# Patient Record
Sex: MALE | Race: BLACK OR AFRICAN AMERICAN | NOT HISPANIC OR LATINO
[De-identification: names, ages, dates, MRNs, and addresses within clinical notes are randomized per-mention and may not be internally consistent; named-entity substitution may affect disease eponyms.]

---

## 2020-10-01 ENCOUNTER — NON-APPOINTMENT (OUTPATIENT)
Age: 60
End: 2020-10-01

## 2020-10-01 ENCOUNTER — APPOINTMENT (OUTPATIENT)
Dept: CARDIOLOGY | Facility: CLINIC | Age: 60
End: 2020-10-01
Payer: COMMERCIAL

## 2020-10-01 VITALS
OXYGEN SATURATION: 98 % | TEMPERATURE: 98.2 F | HEIGHT: 74 IN | WEIGHT: 234 LBS | DIASTOLIC BLOOD PRESSURE: 100 MMHG | SYSTOLIC BLOOD PRESSURE: 140 MMHG | BODY MASS INDEX: 30.03 KG/M2 | HEART RATE: 62 BPM

## 2020-10-01 PROCEDURE — 93306 TTE W/DOPPLER COMPLETE: CPT

## 2020-10-01 PROCEDURE — 99214 OFFICE O/P EST MOD 30 MIN: CPT

## 2020-10-01 PROCEDURE — 93000 ELECTROCARDIOGRAM COMPLETE: CPT
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
## 2020-10-01 NOTE — PHYSICAL EXAM
[General Appearance - Well Developed] : well developed [Normal Appearance] : normal appearance [Well Groomed] : well groomed [General Appearance - Well Nourished] : well nourished [No Deformities] : no deformities [General Appearance - In No Acute Distress] : no acute distress [Normal Conjunctiva] : the conjunctiva exhibited no abnormalities [Eyelids - No Xanthelasma] : the eyelids demonstrated no xanthelasmas [Normal Oral Mucosa] : normal oral mucosa [No Oral Pallor] : no oral pallor [No Oral Cyanosis] : no oral cyanosis [Normal Jugular Venous A Waves Present] : normal jugular venous A waves present [Normal Jugular Venous V Waves Present] : normal jugular venous V waves present [No Jugular Venous Vela A Waves] : no jugular venous vela A waves [Respiration, Rhythm And Depth] : normal respiratory rhythm and effort [Exaggerated Use Of Accessory Muscles For Inspiration] : no accessory muscle use [Auscultation Breath Sounds / Voice Sounds] : lungs were clear to auscultation bilaterally [Heart Rate And Rhythm] : heart rate and rhythm were normal [Heart Sounds] : normal S1 and S2 [Murmurs] : no murmurs present [Abdomen Soft] : soft [Abdomen Tenderness] : non-tender [Abdomen Mass (___ Cm)] : no abdominal mass palpated [Abnormal Walk] : normal gait [Gait - Sufficient For Exercise Testing] : the gait was sufficient for exercise testing [Nail Clubbing] : no clubbing of the fingernails [Cyanosis, Localized] : no localized cyanosis [Petechial Hemorrhages (___cm)] : no petechial hemorrhages [Skin Color & Pigmentation] : normal skin color and pigmentation [] : no rash [No Venous Stasis] : no venous stasis [Skin Lesions] : no skin lesions [No Skin Ulcers] : no skin ulcer [No Xanthoma] : no  xanthoma was observed [Oriented To Time, Place, And Person] : oriented to person, place, and time [Affect] : the affect was normal [Mood] : the mood was normal [No Anxiety] : not feeling anxious

## 2020-10-01 NOTE — DISCUSSION/SUMMARY
[Procedure Low Risk] : the procedure risk is low [Patient Low Risk] : the patient is a low surgical risk [Optimized for Surgery] : the patient is optimized for surgery [As per surgery] : as per surgery [Continue] : Continue medications as currently directed [FreeTextEntry1] : 58 yo male for orthopedic surgery. No prior h/o cardiac disease, abnormal ECG unchanged since 2015. Hypertension adequately controlled. Normal LVEF on echo. No cardiac contraindications to surgery.

## 2021-11-02 ENCOUNTER — NON-APPOINTMENT (OUTPATIENT)
Age: 61
End: 2021-11-02

## 2021-11-02 ENCOUNTER — APPOINTMENT (OUTPATIENT)
Dept: CARDIOLOGY | Facility: CLINIC | Age: 61
End: 2021-11-02
Payer: COMMERCIAL

## 2021-11-02 VITALS
HEIGHT: 74 IN | SYSTOLIC BLOOD PRESSURE: 160 MMHG | WEIGHT: 240 LBS | HEART RATE: 71 BPM | BODY MASS INDEX: 30.8 KG/M2 | OXYGEN SATURATION: 98 % | DIASTOLIC BLOOD PRESSURE: 96 MMHG | TEMPERATURE: 98.2 F

## 2021-11-02 VITALS — DIASTOLIC BLOOD PRESSURE: 94 MMHG | SYSTOLIC BLOOD PRESSURE: 156 MMHG

## 2021-11-02 DIAGNOSIS — Z01.810 ENCOUNTER FOR PREPROCEDURAL CARDIOVASCULAR EXAMINATION: ICD-10-CM

## 2021-11-02 DIAGNOSIS — E66.9 OBESITY, UNSPECIFIED: ICD-10-CM

## 2021-11-02 DIAGNOSIS — I51.7 CARDIOMEGALY: ICD-10-CM

## 2021-11-02 PROCEDURE — 93000 ELECTROCARDIOGRAM COMPLETE: CPT

## 2021-11-02 PROCEDURE — 99214 OFFICE O/P EST MOD 30 MIN: CPT

## 2021-11-02 RX ORDER — TIZANIDINE 2 MG/1
2 TABLET ORAL
Qty: 60 | Refills: 0 | Status: ACTIVE | COMMUNITY
Start: 2021-10-22

## 2021-11-02 NOTE — CARDIOLOGY SUMMARY
[No Ischemia] : no Ischemia [No Exercise Ind Arr] : no exercise induced arrhythmias [No Symptoms] : no Symptoms [___] : [unfilled] [LVEF ___%] : LVEF [unfilled]% [___] : [unfilled] [de-identified] : 11/2/21, Sinus  Rhythm \par -Nonspecific QRS widening and anterior fascicular block. \par  -consider old anterior infarct.

## 2021-11-02 NOTE — DISCUSSION/SUMMARY
[___ Month(s)] : in [unfilled] month(s) [FreeTextEntry1] : 60yo male with no prior h/o cardiac disease, abnormal ECG unchanged since 2015 (prob LVH with widened QRS). Hypertension inadequately controlled, only taking labetalol once daily, would be better served from twice daily usage and continue to monitor his blood pressures.  Follow-up with PCP, self measured systolic blood pressure goal should be in the 120s.  We will follow-up in 6 to 8 months.  Increased weight noted, fairly sedentary because of neck injury, awaiting clearance from orthopedist to begin aerobic exercise.  Will get copies of labs from PCP.
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
## 2022-06-10 ENCOUNTER — APPOINTMENT (OUTPATIENT)
Dept: CARDIOLOGY | Facility: CLINIC | Age: 62
End: 2022-06-10
Payer: COMMERCIAL

## 2022-06-10 ENCOUNTER — NON-APPOINTMENT (OUTPATIENT)
Age: 62
End: 2022-06-10

## 2022-06-10 VITALS
OXYGEN SATURATION: 98 % | DIASTOLIC BLOOD PRESSURE: 94 MMHG | HEIGHT: 74 IN | TEMPERATURE: 97.9 F | BODY MASS INDEX: 29.39 KG/M2 | HEART RATE: 70 BPM | WEIGHT: 229 LBS | SYSTOLIC BLOOD PRESSURE: 150 MMHG

## 2022-06-10 DIAGNOSIS — R94.31 ABNORMAL ELECTROCARDIOGRAM [ECG] [EKG]: ICD-10-CM

## 2022-06-10 DIAGNOSIS — I10 ESSENTIAL (PRIMARY) HYPERTENSION: ICD-10-CM

## 2022-06-10 PROCEDURE — 99213 OFFICE O/P EST LOW 20 MIN: CPT

## 2022-06-10 PROCEDURE — 93000 ELECTROCARDIOGRAM COMPLETE: CPT

## 2022-06-10 NOTE — CARDIOLOGY SUMMARY
[de-identified] : 6/10/22, Sinus Rhythm \par -Nonspecific QRS widening and anterior fascicular block. \par -consider old anterior infarct.\par 11/2/21, Sinus  Rhythm, -Nonspecific QRS widening and anterior fascicular block.  -consider old anterior infarct.  [No Ischemia] : no Ischemia [No Exercise Ind Arr] : no exercise induced arrhythmias [No Symptoms] : no Symptoms [___] : [unfilled] [LVEF ___%] : LVEF [unfilled]% [___] : [unfilled]
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
## 2022-06-10 NOTE — DISCUSSION/SUMMARY
[___ Month(s)] : in [unfilled] month(s) [FreeTextEntry1] : 62 yo male with no prior h/o cardiac disease, abnormal ECG unchanged since 2015 (probably LVH with widened QRS). Hypertension with white coat effect - will call patient in 2-3 weeks and assess his self-measured BP's.  Continue current Rx for now. Labs reviewed. Increase activity as tolerated. If BP's adequate, routine f/u in 6 months.

## 2022-12-16 ENCOUNTER — APPOINTMENT (OUTPATIENT)
Dept: CARDIOLOGY | Facility: CLINIC | Age: 62
End: 2022-12-16

## 2025-01-03 ENCOUNTER — EMERGENCY (EMERGENCY)
Facility: HOSPITAL | Age: 65
LOS: 0 days | Discharge: ANOTHER HOSPITAL | End: 2025-01-03
Attending: STUDENT IN AN ORGANIZED HEALTH CARE EDUCATION/TRAINING PROGRAM
Payer: COMMERCIAL

## 2025-01-03 ENCOUNTER — INPATIENT (INPATIENT)
Facility: HOSPITAL | Age: 65
LOS: 5 days | Discharge: HOME | DRG: 948 | End: 2025-01-09
Attending: STUDENT IN AN ORGANIZED HEALTH CARE EDUCATION/TRAINING PROGRAM | Admitting: STUDENT IN AN ORGANIZED HEALTH CARE EDUCATION/TRAINING PROGRAM
Payer: COMMERCIAL

## 2025-01-03 VITALS
OXYGEN SATURATION: 96 % | SYSTOLIC BLOOD PRESSURE: 179 MMHG | RESPIRATION RATE: 16 BRPM | DIASTOLIC BLOOD PRESSURE: 109 MMHG | WEIGHT: 220.02 LBS | TEMPERATURE: 101 F | HEART RATE: 83 BPM

## 2025-01-03 VITALS
DIASTOLIC BLOOD PRESSURE: 99 MMHG | RESPIRATION RATE: 18 BRPM | TEMPERATURE: 101 F | HEART RATE: 82 BPM | OXYGEN SATURATION: 97 % | SYSTOLIC BLOOD PRESSURE: 157 MMHG

## 2025-01-03 VITALS
SYSTOLIC BLOOD PRESSURE: 196 MMHG | DIASTOLIC BLOOD PRESSURE: 115 MMHG | HEART RATE: 98 BPM | WEIGHT: 240.08 LBS | OXYGEN SATURATION: 97 % | HEIGHT: 75 IN | TEMPERATURE: 100 F | RESPIRATION RATE: 20 BRPM

## 2025-01-03 DIAGNOSIS — R41.82 ALTERED MENTAL STATUS, UNSPECIFIED: ICD-10-CM

## 2025-01-03 DIAGNOSIS — I62.00 NONTRAUMATIC SUBDURAL HEMORRHAGE, UNSPECIFIED: ICD-10-CM

## 2025-01-03 DIAGNOSIS — I10 ESSENTIAL (PRIMARY) HYPERTENSION: ICD-10-CM

## 2025-01-03 DIAGNOSIS — Z88.1 ALLERGY STATUS TO OTHER ANTIBIOTIC AGENTS: ICD-10-CM

## 2025-01-03 LAB
ALBUMIN SERPL ELPH-MCNC: 3.6 G/DL — SIGNIFICANT CHANGE UP (ref 3.3–5)
ALBUMIN SERPL ELPH-MCNC: 3.9 G/DL — SIGNIFICANT CHANGE UP (ref 3.3–5)
ALP SERPL-CCNC: 87 U/L — SIGNIFICANT CHANGE UP (ref 40–120)
ALP SERPL-CCNC: 98 U/L — SIGNIFICANT CHANGE UP (ref 40–120)
ALT FLD-CCNC: 24 U/L — SIGNIFICANT CHANGE UP (ref 10–45)
ALT FLD-CCNC: 35 U/L — SIGNIFICANT CHANGE UP (ref 12–78)
ANION GAP SERPL CALC-SCNC: 12 MMOL/L — SIGNIFICANT CHANGE UP (ref 5–17)
ANION GAP SERPL CALC-SCNC: 20 MMOL/L — HIGH (ref 5–17)
APPEARANCE UR: CLEAR — SIGNIFICANT CHANGE UP
APTT BLD: 24.8 SEC — SIGNIFICANT CHANGE UP (ref 24.5–35.6)
APTT BLD: 25.3 SEC — SIGNIFICANT CHANGE UP (ref 24.5–35.6)
AST SERPL-CCNC: 29 U/L — SIGNIFICANT CHANGE UP (ref 10–40)
AST SERPL-CCNC: 29 U/L — SIGNIFICANT CHANGE UP (ref 15–37)
BASOPHILS # BLD AUTO: 0.03 K/UL — SIGNIFICANT CHANGE UP (ref 0–0.2)
BASOPHILS # BLD AUTO: 0.03 K/UL — SIGNIFICANT CHANGE UP (ref 0–0.2)
BASOPHILS NFR BLD AUTO: 0.3 % — SIGNIFICANT CHANGE UP (ref 0–2)
BASOPHILS NFR BLD AUTO: 0.3 % — SIGNIFICANT CHANGE UP (ref 0–2)
BILIRUB SERPL-MCNC: 0.6 MG/DL — SIGNIFICANT CHANGE UP (ref 0.2–1.2)
BILIRUB SERPL-MCNC: 0.6 MG/DL — SIGNIFICANT CHANGE UP (ref 0.2–1.2)
BILIRUB UR-MCNC: NEGATIVE — SIGNIFICANT CHANGE UP
BUN SERPL-MCNC: 14 MG/DL — SIGNIFICANT CHANGE UP (ref 7–23)
BUN SERPL-MCNC: 18 MG/DL — SIGNIFICANT CHANGE UP (ref 7–23)
CALCIUM SERPL-MCNC: 8.4 MG/DL — SIGNIFICANT CHANGE UP (ref 8.4–10.5)
CALCIUM SERPL-MCNC: 9.7 MG/DL — SIGNIFICANT CHANGE UP (ref 8.5–10.1)
CHLORIDE SERPL-SCNC: 101 MMOL/L — SIGNIFICANT CHANGE UP (ref 96–108)
CHLORIDE SERPL-SCNC: 97 MMOL/L — SIGNIFICANT CHANGE UP (ref 96–108)
CO2 SERPL-SCNC: 18 MMOL/L — LOW (ref 22–31)
CO2 SERPL-SCNC: 25 MMOL/L — SIGNIFICANT CHANGE UP (ref 22–31)
COLOR SPEC: YELLOW — SIGNIFICANT CHANGE UP
CREAT SERPL-MCNC: 1.09 MG/DL — SIGNIFICANT CHANGE UP (ref 0.5–1.3)
CREAT SERPL-MCNC: 1.61 MG/DL — HIGH (ref 0.5–1.3)
DIFF PNL FLD: NEGATIVE — SIGNIFICANT CHANGE UP
EGFR: 48 ML/MIN/1.73M2 — LOW
EGFR: 77 ML/MIN/1.73M2 — SIGNIFICANT CHANGE UP
EOSINOPHIL # BLD AUTO: 0 K/UL — SIGNIFICANT CHANGE UP (ref 0–0.5)
EOSINOPHIL # BLD AUTO: 0 K/UL — SIGNIFICANT CHANGE UP (ref 0–0.5)
EOSINOPHIL NFR BLD AUTO: 0 % — SIGNIFICANT CHANGE UP (ref 0–6)
EOSINOPHIL NFR BLD AUTO: 0 % — SIGNIFICANT CHANGE UP (ref 0–6)
FLUAV AG NPH QL: SIGNIFICANT CHANGE UP
FLUBV AG NPH QL: SIGNIFICANT CHANGE UP
GAS PNL BLDV: SIGNIFICANT CHANGE UP
GLUCOSE SERPL-MCNC: 323 MG/DL — HIGH (ref 70–99)
GLUCOSE SERPL-MCNC: 406 MG/DL — HIGH (ref 70–99)
GLUCOSE UR QL: >=1000 MG/DL
HCT VFR BLD CALC: 40.5 % — SIGNIFICANT CHANGE UP (ref 39–50)
HCT VFR BLD CALC: 43.1 % — SIGNIFICANT CHANGE UP (ref 39–50)
HGB BLD-MCNC: 13.9 G/DL — SIGNIFICANT CHANGE UP (ref 13–17)
HGB BLD-MCNC: 14.9 G/DL — SIGNIFICANT CHANGE UP (ref 13–17)
IMM GRANULOCYTES NFR BLD AUTO: 0.2 % — SIGNIFICANT CHANGE UP (ref 0–0.9)
IMM GRANULOCYTES NFR BLD AUTO: 0.4 % — SIGNIFICANT CHANGE UP (ref 0–0.9)
INR BLD: 0.99 RATIO — SIGNIFICANT CHANGE UP (ref 0.85–1.16)
INR BLD: 1.1 RATIO — SIGNIFICANT CHANGE UP (ref 0.85–1.16)
KETONES UR-MCNC: 40 MG/DL
LEUKOCYTE ESTERASE UR-ACNC: NEGATIVE — SIGNIFICANT CHANGE UP
LYMPHOCYTES # BLD AUTO: 1.41 K/UL — SIGNIFICANT CHANGE UP (ref 1–3.3)
LYMPHOCYTES # BLD AUTO: 15.1 % — SIGNIFICANT CHANGE UP (ref 13–44)
LYMPHOCYTES # BLD AUTO: 2.42 K/UL — SIGNIFICANT CHANGE UP (ref 1–3.3)
LYMPHOCYTES # BLD AUTO: 21.9 % — SIGNIFICANT CHANGE UP (ref 13–44)
MCHC RBC-ENTMCNC: 28.3 PG — SIGNIFICANT CHANGE UP (ref 27–34)
MCHC RBC-ENTMCNC: 28.5 PG — SIGNIFICANT CHANGE UP (ref 27–34)
MCHC RBC-ENTMCNC: 34.3 G/DL — SIGNIFICANT CHANGE UP (ref 32–36)
MCHC RBC-ENTMCNC: 34.6 G/DL — SIGNIFICANT CHANGE UP (ref 32–36)
MCV RBC AUTO: 81.8 FL — SIGNIFICANT CHANGE UP (ref 80–100)
MCV RBC AUTO: 83 FL — SIGNIFICANT CHANGE UP (ref 80–100)
MONOCYTES # BLD AUTO: 0.33 K/UL — SIGNIFICANT CHANGE UP (ref 0–0.9)
MONOCYTES # BLD AUTO: 0.7 K/UL — SIGNIFICANT CHANGE UP (ref 0–0.9)
MONOCYTES NFR BLD AUTO: 3.5 % — SIGNIFICANT CHANGE UP (ref 2–14)
MONOCYTES NFR BLD AUTO: 6.3 % — SIGNIFICANT CHANGE UP (ref 2–14)
NEUTROPHILS # BLD AUTO: 7.52 K/UL — HIGH (ref 1.8–7.4)
NEUTROPHILS # BLD AUTO: 7.85 K/UL — HIGH (ref 1.8–7.4)
NEUTROPHILS NFR BLD AUTO: 71.1 % — SIGNIFICANT CHANGE UP (ref 43–77)
NEUTROPHILS NFR BLD AUTO: 80.9 % — HIGH (ref 43–77)
NITRITE UR-MCNC: NEGATIVE — SIGNIFICANT CHANGE UP
NRBC # BLD: 0 /100 WBCS — SIGNIFICANT CHANGE UP (ref 0–0)
NRBC # BLD: 0 /100 WBCS — SIGNIFICANT CHANGE UP (ref 0–0)
PH UR: 6.5 — SIGNIFICANT CHANGE UP (ref 5–8)
PLATELET # BLD AUTO: 270 K/UL — SIGNIFICANT CHANGE UP (ref 150–400)
PLATELET # BLD AUTO: 279 K/UL — SIGNIFICANT CHANGE UP (ref 150–400)
POTASSIUM SERPL-MCNC: 3.6 MMOL/L — SIGNIFICANT CHANGE UP (ref 3.5–5.3)
POTASSIUM SERPL-MCNC: 3.9 MMOL/L — SIGNIFICANT CHANGE UP (ref 3.5–5.3)
POTASSIUM SERPL-SCNC: 3.6 MMOL/L — SIGNIFICANT CHANGE UP (ref 3.5–5.3)
POTASSIUM SERPL-SCNC: 3.9 MMOL/L — SIGNIFICANT CHANGE UP (ref 3.5–5.3)
PROT SERPL-MCNC: 7.5 G/DL — SIGNIFICANT CHANGE UP (ref 6–8.3)
PROT SERPL-MCNC: 8.4 GM/DL — HIGH (ref 6–8.3)
PROT UR-MCNC: 30 MG/DL
PROTHROM AB SERPL-ACNC: 11.5 SEC — SIGNIFICANT CHANGE UP (ref 9.9–13.4)
PROTHROM AB SERPL-ACNC: 12.6 SEC — SIGNIFICANT CHANGE UP (ref 9.9–13.4)
RBC # BLD: 4.88 M/UL — SIGNIFICANT CHANGE UP (ref 4.2–5.8)
RBC # BLD: 5.27 M/UL — SIGNIFICANT CHANGE UP (ref 4.2–5.8)
RBC # FLD: 13.9 % — SIGNIFICANT CHANGE UP (ref 10.3–14.5)
RBC # FLD: 14.1 % — SIGNIFICANT CHANGE UP (ref 10.3–14.5)
RSV RNA NPH QL NAA+NON-PROBE: SIGNIFICANT CHANGE UP
SARS-COV-2 RNA SPEC QL NAA+PROBE: SIGNIFICANT CHANGE UP
SODIUM SERPL-SCNC: 134 MMOL/L — LOW (ref 135–145)
SODIUM SERPL-SCNC: 139 MMOL/L — SIGNIFICANT CHANGE UP (ref 135–145)
SP GR SPEC: 1.02 — SIGNIFICANT CHANGE UP (ref 1–1.03)
UROBILINOGEN FLD QL: 0.2 MG/DL — SIGNIFICANT CHANGE UP (ref 0.2–1)
WBC # BLD: 11.04 K/UL — HIGH (ref 3.8–10.5)
WBC # BLD: 9.31 K/UL — SIGNIFICANT CHANGE UP (ref 3.8–10.5)
WBC # FLD AUTO: 11.04 K/UL — HIGH (ref 3.8–10.5)
WBC # FLD AUTO: 9.31 K/UL — SIGNIFICANT CHANGE UP (ref 3.8–10.5)

## 2025-01-03 PROCEDURE — 99285 EMERGENCY DEPT VISIT HI MDM: CPT | Mod: 25

## 2025-01-03 PROCEDURE — 99285 EMERGENCY DEPT VISIT HI MDM: CPT

## 2025-01-03 PROCEDURE — 70450 CT HEAD/BRAIN W/O DYE: CPT | Mod: 26,MC

## 2025-01-03 PROCEDURE — 71045 X-RAY EXAM CHEST 1 VIEW: CPT | Mod: 26

## 2025-01-03 PROCEDURE — 62270 DX LMBR SPI PNXR: CPT

## 2025-01-03 PROCEDURE — 93010 ELECTROCARDIOGRAM REPORT: CPT

## 2025-01-03 RX ORDER — 0.9 % SODIUM CHLORIDE 0.9 %
3400 INTRAVENOUS SOLUTION INTRAVENOUS ONCE
Refills: 0 | Status: DISCONTINUED | OUTPATIENT
Start: 2025-01-03 | End: 2025-01-03

## 2025-01-03 RX ORDER — ACETAMINOPHEN 500MG 500 MG/1
1000 TABLET, COATED ORAL ONCE
Refills: 0 | Status: COMPLETED | OUTPATIENT
Start: 2025-01-03 | End: 2025-01-03

## 2025-01-03 RX ORDER — VANCOMYCIN HCL 900 MCG/MG
1000 POWDER (GRAM) MISCELLANEOUS ONCE
Refills: 0 | Status: COMPLETED | OUTPATIENT
Start: 2025-01-03 | End: 2025-01-03

## 2025-01-03 RX ORDER — SODIUM CHLORIDE 9 MG/ML
3400 INJECTION, SOLUTION INTRAMUSCULAR; INTRAVENOUS; SUBCUTANEOUS ONCE
Refills: 0 | Status: COMPLETED | OUTPATIENT
Start: 2025-01-03 | End: 2025-01-03

## 2025-01-03 RX ORDER — PIPERACILLIN SODIUM AND TAZOBACTAM SODIUM 4; .5 G/20ML; G/20ML
3.38 INJECTION, POWDER, LYOPHILIZED, FOR SOLUTION INTRAVENOUS ONCE
Refills: 0 | Status: COMPLETED | OUTPATIENT
Start: 2025-01-03 | End: 2025-01-03

## 2025-01-03 RX ORDER — LIDOCAINE HYDROCHLORIDE 10 MG/ML
10 INJECTION INFILTRATION; PERINEURAL ONCE
Refills: 0 | Status: COMPLETED | OUTPATIENT
Start: 2025-01-03 | End: 2025-01-03

## 2025-01-03 RX ADMIN — Medication 250 MILLIGRAM(S): at 20:04

## 2025-01-03 RX ADMIN — ACETAMINOPHEN 500MG 1000 MILLIGRAM(S): 500 TABLET, COATED ORAL at 19:30

## 2025-01-03 RX ADMIN — PIPERACILLIN SODIUM AND TAZOBACTAM SODIUM 200 GRAM(S): 4; .5 INJECTION, POWDER, LYOPHILIZED, FOR SOLUTION INTRAVENOUS at 18:46

## 2025-01-03 RX ADMIN — LIDOCAINE HYDROCHLORIDE 10 MILLILITER(S): 10 INJECTION INFILTRATION; PERINEURAL at 23:10

## 2025-01-03 RX ADMIN — ACETAMINOPHEN 500MG 400 MILLIGRAM(S): 500 TABLET, COATED ORAL at 18:39

## 2025-01-03 RX ADMIN — SODIUM CHLORIDE 3400 MILLILITER(S): 9 INJECTION, SOLUTION INTRAMUSCULAR; INTRAVENOUS; SUBCUTANEOUS at 18:39

## 2025-01-03 NOTE — ED ADULT TRIAGE NOTE - CHIEF COMPLAINT QUOTE
Increase thirst ,Increase urination  By EMS H/O  HTN Increase thirst ,Increase urination  By EMS H/O  HTN  IV Left AC by EMS

## 2025-01-03 NOTE — ED ADULT NURSE NOTE - OBJECTIVE STATEMENT
64 year old male A/Ox1 accompanied by wife and family c/o AMS, wife reports pt was normal at 0600 this AM, upon coming home sister found patient to be altered, pt unable to form sentences or words at this moment, pt placed on portable monitor, even and unlabored respirations noted, patient at baseline is A/Ox4 and ambulatory with a steady gait

## 2025-01-03 NOTE — ED ADULT NURSE NOTE - NSFALLRISKINTERV_ED_ALL_ED

## 2025-01-03 NOTE — ED PROVIDER NOTE - OBJECTIVE STATEMENT
64-year-old male history of hypertension presenting with concerns of altered mental status.  Patient accompanied by daughter and wife for collateral.  States they found the patient around 6 PM with increased lethargy.  States they saw him this morning around 6 AM and patient was endorsing a mild headache however was conversive.  Patient has had increased urination with increased thirst over the past week.  Patient was brought in by EMS with a fingerstick of 398.  Patient following commands at the bedside.  Denies any URI symptoms, abdominal pain, nausea vomiting diarrhea. 64-year-old male history of hypertension presenting with concerns of altered mental status.  Patient accompanied by daughter and wife for collateral.  States they found the patient around 6 PM with increased lethargy.  States they saw him this morning around 6 AM and patient was endorsing a mild headache however was conversive.  Patient has had increased urination with increased thirst over the past week.  Patient was brought in by EMS with a fingerstick of 398.  Patient following commands at the bedside. Family denies any recent trauma or falls.  Denies any URI symptoms, abdominal pain, nausea vomiting diarrhea, urinary complaints. No recent travel/sick contacts.

## 2025-01-03 NOTE — ED ADULT NURSE NOTE - OBJECTIVE STATEMENT
Pt is a 65y/o M BIBEMS from [facility] to [facility] ED for neuro consult. As per EMS, pt has been experiencing 4-6 days of headaches with an associated fever, was seen at [facility], was found to be febrile to 104, hyperglycemic to 400s and only oriented to himself, neuro team also dx pt with a subdural hematoma, transfer for neuro consult, pt hypertensive, arrived with b/l PIV placed. As per neuro MD here, pt does not have a subdural hematoma, is suspected encephalitis droplet precautions. Upon physical assessment, pt is aware he is confused, placed on CM and continuos pulse ox, pt skin warm and dry. Pt is A&Ox1 currently denying any visual deficits, SOB, cough, CP, palpitations, abd pain, urinary symptoms, n/v/d/c, fever/chills, Pt ambulates independently at baseline. Bedrail's up and comfort measures provided

## 2025-01-03 NOTE — ED ADULT TRIAGE NOTE - AS PAIN REST
Clinic Care Coordination Contact    Situation: Patient chart reviewed by care coordinator.    Background: pt due for call or close as she has been unable to reach the last two calls.     Assessment: upon chart review pt has not reviewed the last several mychart messages including the unable to contact letter    Plan/Recommendations: will mail unable to contact letter and assess next action in one month.     TRAMAINE Ramirez   [facility] - Care Coordination  [facility]   [phone]    
0 (no pain/absence of nonverbal indicators of pain)

## 2025-01-03 NOTE — ED ADULT NURSE NOTE - NS TRANSFER PATIENT BELONGINGS
[de-identified] : The patient was advised of the diagnosis.  The natural history of the pathology was explained in full to the patient in layman's terms. All questions were answered.  The risks and benefits of surgical and non-surgical treatment alternatives were explained in full to the patient.\par \par The risks, benefits, contents and alternatives to injection were explained in full to the patient.  Risks outlined include but are not limited to infection, sepsis, bleeding, scarring, skin discoloration, temporary increase in pain, syncopal episode, failure to resolve symptoms, allergic reaction, flare reaction, permanent white skin discoloration, symptom recurrence, and elevation of blood sugar in diabetics.  Patient understood the risks.  All questions were answered.  After discussion of options, patient requested an injection.  Oral informed consent was obtained and sterile prep was done of the injection site.  Sterile technique was used to introduce the mixture.  Patient tolerated the procedure well.  Patient advised to ice the injection site this evening.  Signs and symptoms of infection reviewed and patient advised to call immediately for redness, fevers, and/or chills. \par 
Clothing

## 2025-01-03 NOTE — ED ADULT NURSE NOTE - NSFALLRISKINTERV_ED_ALL_ED
Assistance OOB with selected safe patient handling equipment if applicable/Assistance with ambulation/Communicate fall risk and risk factors to all staff, patient, and family/Monitor gait and stability/Monitor for mental status changes and reorient to person, place, and time, as needed/Move patient closer to nursing station/within visual sight of ED staff/Provide visual cue: yellow wristband, yellow gown, etc/Reinforce activity limits and safety measures with patient and family/Toileting schedule using arm’s reach rule for commode and bathroom/Use of alarms - bed, stretcher, chair and/or video monitoring/Call bell, personal items and telephone in reach/Instruct patient to call for assistance before getting out of bed/chair/stretcher/Non-slip footwear applied when patient is off stretcher/[place] to call system/Physically safe environment - no spills, clutter or unnecessary equipment/Purposeful Proactive Rounding/Room/bathroom lighting operational, light cord in reach

## 2025-01-03 NOTE — ED PROVIDER NOTE - PHYSICAL EXAMINATION
Arias Geronimo DO (PGY3)   Physical Exam:    Gen: NAD, AOx1  Head: NCAT  HEENT: EOMI,  Lung: CTAB  CV: RRR  Abd: soft, NT, ND  MSK: no visible deformities, ROM normal in UE/LE  Neuro: No focal sensory or motor deficits.  Skin: Warm, well perfused, no rash, no leg swelling Arias Geronimo DO (PGY3)   Physical Exam:    Gen: NAD, AOx1  Head: NCAT  HEENT: EOMI,  Lung: CTAB  CV: RRR  Abd: soft, NT, ND  MSK: no visible deformities, ROM normal in UE/LE  Neuro: moving all extremities   Skin: Warm, well perfused, no rash, no leg swelling Arias Geronimo DO (PGY3)   Physical Exam:    Gen: NAD, AOx1  Head: NCAT  HEENT: EOMI,  Lung: CTAB  CV: RRR  Abd: soft, NT, ND  MSK: no visible deformities, ROM normal in UE/LE  Neuro: moving all extremities, following basic commands   Skin: Warm, well perfused, no rash, no leg swelling

## 2025-01-03 NOTE — ED PROVIDER NOTE - PROGRESS NOTE DETAILS
patient with subdural hematoma, no midline shift - will transfer for nsgy eval patient with subdural hematoma, no midline shift - will call transfer center for transfer for nsgy eval Arias Geronimo DO (PGY3)  patient accepted for transfer to [facility] under Dr. Grant (Neurology).

## 2025-01-03 NOTE — ED ADULT NURSE NOTE - ED STAT RN HAND OFF
Giuliano Steele 60 y o  male MRN: [id number]    Encounter: [id number]      [facility] )     [facility] [address] CORONARY ARTERY BYPASS GRAFT  2010    x4    MT ESOPHAGOGASTRODUODENOSCOPY TRANSORAL DIAGNOSTIC N/A 1/8/2019    Procedure: ESOPHAGOGASTRODUODENOSCOPY (EGD); Surgeon: Alley Royal MD;  Location: Redlands Community Handoff

## 2025-01-03 NOTE — ED ADULT NURSE NOTE - EXTENSIONS OF SELF_ADULT
Yayo Galvan is a 58 year old male here for  Chief Complaint   Patient presents with   • Cancer   • Follow-up     Denies latex allergy or sensitivity.    Medication verified, no changes.  PCP and Pharmacy verified.    Social History     Tobacco Use   Smoking Status Current Every Day Smoker   • Packs/day: 1.00   • Years: 40.00   • Pack years: 40.00   • Types: Cigarettes   • Start date: 1980   Smokeless Tobacco Never Used   Tobacco Comment    3-4 cigarrettes per day     Advance Directives Filed: No    ECOG:   ECOG [10/24/22 1121]   ECOG Performance Status 1       Vitals:    Visit Vitals  BP 93/65   Pulse (!) 104   Temp 98.2 °F (36.8 °C) (Temporal)   Resp 19   Wt 68.5 kg (150 lb 14.5 oz)   SpO2 97%   BMI 20.47 kg/m²       These vital signs are:  Within defined parameters (Per Reference \"Defined Limits Hospital Outpatient Department (HOD)\")    Height: No.  Ht Readings from Last 1 Encounters:   09/06/22 6' (1.829 m)     Weight:Yes, shoes on.  Wt Readings from Last 3 Encounters:   10/24/22 68.5 kg (150 lb 14.5 oz)   09/12/22 70.4 kg (155 lb 3.2 oz)   09/08/22 70.2 kg (154 lb 12.2 oz)       BMI: Body mass index is 20.47 kg/m².    REVIEW OF SYSTEMS  GENERAL:  Patient denies headache, fevers, chills, night sweats, excessive fatigue, change in appetite, weight loss, dizziness, but complains of: change in appetite  ALLERGIC/IMMUNOLOGIC: Verified allergies: Yes  EYES:  Patient denies significant visual difficulties, double vision, blurred vision  ENT/MOUTH: Patient denies problems with hearing, sore throat, sinus drainage, mouth sores  ENDOCRINE:  Patient denies diabetes, thyroid disease, hormone replacement, hot flashes  HEMATOLOGIC/LYMPHATIC: Patient denies easy bruising, bleeding, tender lymph nodes, swollen lymph nodes  BREASTS: Patient denies abnormal masses of breast, nipple discharge, pain  RESPIRATORY:  Patient denies lung pain with breathing, cough, coughing up blood, shortness of breath  CARDIOVASCULAR:  Patient denies  anginal chest pain, palpitations, shortness of breath when lying flat, peripheral edema  GASTROINTESTINAL: Patient denies abdominal pain , nausea, vomiting, diarrhea, GI bleeding, constipation, change in bowel habits, heartburn, sensation of feeling full, difficulty swallowing, but complains of: abdominal pain, pain rating:  3, location: center   : Patient denies blood in the urine, burning with urination, frequency, urgency, hesitancy, incontinence  MUSCULOSKELETAL:  Patient denies joint pain, bone pain, joint swelling, redness, decreased range of motion  SKIN:  Patient denies chronic rashes, inflammation, ulcerations, skin changes, itching  NEUROLOGIC:  Patient denies loss of balance, areas of focal weakness, abnormal gait, sensory problems, numbness, tingling  PSYCHIATRIC: Patient denies insomnia, depression, anxiety    This patient reported abnormal symptoms that needed immediate verbal communication: No   None

## 2025-01-03 NOTE — ED PROVIDER NOTE - CLINICAL SUMMARY MEDICAL DECISION MAKING FREE TEXT BOX
64-year-old male history of hypertension presenting with concerns of altered mental status.  Patient accompanied by daughter and wife for collateral.  States they found the patient around 6 PM with increased lethargy.  States they saw him this morning around 6 AM and patient was endorsing a mild headache however was conversive.  Patient has had increased urination with increased thirst over the past week.  Patient was brought in by EMS with a fingerstick of 398.     Rectal temp 102.7, fingerstick 398, patient hypertensive, not hypoxic.  Plan for sepsis workup with basic labs, blood cultures, VBG, urinalysis with urine culture, procalcitonin.  Will send off.  Hydroxybutyrate, chest x-ray CT head.  IV fluids antibiotics fever control  Differential diagnosis includes but not limited to DKA versus infectious etiology versus viral illness versus metabolic derangement versus ICH 64-year-old male history of hypertension presenting with concerns of altered mental status.  Patient accompanied by daughter and wife for collateral.  States they found the patient around 6 PM with increased lethargy.  States they saw him this morning around 6 AM and patient was endorsing a mild headache however was conversive.  Patient has had increased urination with increased thirst over the past week.  Patient was brought in by EMS with a fingerstick of 398. Patient AOX1, normally AOX4.     Rectal temp 102.7, fingerstick 398, patient hypertensive, not hypoxic.  Plan for sepsis workup with basic labs, blood cultures, VBG, urinalysis with urine culture, procalcitonin.  Will send off.  Hydroxybutyrate, chest x-ray CT head.  IV fluids antibiotics fever control  Differential diagnosis includes but not limited to DKA versus infectious etiology versus viral illness versus metabolic derangement versus ICH 64-year-old male history of hypertension presenting with concerns of altered mental status.  Patient accompanied by daughter and wife for collateral.  States they found the patient around 6 PM with increased lethargy.  States they saw him this morning around 6 AM and patient was endorsing a mild headache however was conversive.  Patient has had increased urination with increased thirst over the past week.  Patient was brought in by EMS with a fingerstick of 398. Patient AOX1, normally AOX4.     Rectal temp 102.7, fingerstick 398, patient hypertensive, not hypoxic.  Plan for sepsis workup with basic labs, blood cultures, VBG, urinalysis with urine culture, procalcitonin.  Will send off hydroxybutyrate. Chest x-ray CT head.  IV fluids, antibiotics, fever control.  Differential diagnosis includes but not limited to DKA versus infectious etiology versus viral illness versus metabolic derangement versus ICH

## 2025-01-04 DIAGNOSIS — G03.9 MENINGITIS, UNSPECIFIED: ICD-10-CM

## 2025-01-04 DIAGNOSIS — I10 ESSENTIAL (PRIMARY) HYPERTENSION: ICD-10-CM

## 2025-01-04 DIAGNOSIS — Z29.9 ENCOUNTER FOR PROPHYLACTIC MEASURES, UNSPECIFIED: ICD-10-CM

## 2025-01-04 DIAGNOSIS — R41.82 ALTERED MENTAL STATUS, UNSPECIFIED: ICD-10-CM

## 2025-01-04 LAB
A1C WITH ESTIMATED AVERAGE GLUCOSE RESULT: 11.8 % — HIGH (ref 4–5.6)
ADD ON TEST-SPECIMEN IN LAB: SIGNIFICANT CHANGE UP
ADD ON TEST-SPECIMEN IN LAB: SIGNIFICANT CHANGE UP
ANION GAP SERPL CALC-SCNC: 20 MMOL/L — HIGH (ref 5–17)
ANION GAP SERPL CALC-SCNC: 23 MMOL/L — HIGH (ref 5–17)
APPEARANCE CSF: CLEAR — SIGNIFICANT CHANGE UP
APPEARANCE CSF: CLEAR — SIGNIFICANT CHANGE UP
B-OH-BUTYR SERPL-SCNC: 3.5 MMOL/L — HIGH
B-OH-BUTYR SERPL-SCNC: 3.8 MMOL/L — HIGH
BUN SERPL-MCNC: 12 MG/DL — SIGNIFICANT CHANGE UP (ref 7–23)
BUN SERPL-MCNC: 16 MG/DL — SIGNIFICANT CHANGE UP (ref 7–23)
CALCIUM SERPL-MCNC: 8.6 MG/DL — SIGNIFICANT CHANGE UP (ref 8.4–10.5)
CALCIUM SERPL-MCNC: 8.8 MG/DL — SIGNIFICANT CHANGE UP (ref 8.4–10.5)
CHLORIDE SERPL-SCNC: 101 MMOL/L — SIGNIFICANT CHANGE UP (ref 96–108)
CHLORIDE SERPL-SCNC: 95 MMOL/L — LOW (ref 96–108)
CO2 SERPL-SCNC: 14 MMOL/L — LOW (ref 22–31)
CO2 SERPL-SCNC: 19 MMOL/L — LOW (ref 22–31)
COLOR CSF: SIGNIFICANT CHANGE UP
COLOR CSF: SIGNIFICANT CHANGE UP
CREAT SERPL-MCNC: 1 MG/DL — SIGNIFICANT CHANGE UP (ref 0.5–1.3)
CREAT SERPL-MCNC: 1.07 MG/DL — SIGNIFICANT CHANGE UP (ref 0.5–1.3)
CRYPTOC AG CSF-ACNC: NEGATIVE — SIGNIFICANT CHANGE UP
CSF PCR RESULT: SIGNIFICANT CHANGE UP
EGFR: 78 ML/MIN/1.73M2 — SIGNIFICANT CHANGE UP
EGFR: 85 ML/MIN/1.73M2 — SIGNIFICANT CHANGE UP
ESTIMATED AVERAGE GLUCOSE: 292 MG/DL — HIGH (ref 68–114)
FLUAV AG NPH QL: SIGNIFICANT CHANGE UP
FLUBV AG NPH QL: SIGNIFICANT CHANGE UP
GAS PNL BLDV: SIGNIFICANT CHANGE UP
GAS PNL BLDV: SIGNIFICANT CHANGE UP
GLUCOSE BLDC GLUCOMTR-MCNC: 490 MG/DL — CRITICAL HIGH (ref 70–99)
GLUCOSE BLDC GLUCOMTR-MCNC: 504 MG/DL — CRITICAL HIGH (ref 70–99)
GLUCOSE BLDC GLUCOMTR-MCNC: 520 MG/DL — CRITICAL HIGH (ref 70–99)
GLUCOSE CSF-MCNC: 258 MG/DL — HIGH (ref 40–70)
GLUCOSE SERPL-MCNC: 269 MG/DL — HIGH (ref 70–99)
GLUCOSE SERPL-MCNC: 595 MG/DL — CRITICAL HIGH (ref 70–99)
GRAM STN FLD: SIGNIFICANT CHANGE UP
LDH CSF L TO P-CCNC: 19 U/L — SIGNIFICANT CHANGE UP
LDH FLD-CCNC: 19 U/L — SIGNIFICANT CHANGE UP
LYMPHOCYTES # CSF: 3 % — LOW (ref 40–80)
LYMPHOCYTES # CSF: 6 % — LOW (ref 40–80)
MONOS+MACROS NFR CSF: 2 % — LOW (ref 15–45)
MONOS+MACROS NFR CSF: 3 % — LOW (ref 15–45)
NEUTROPHILS # CSF: 91 % — HIGH (ref 0–6)
NEUTROPHILS # CSF: 95 % — HIGH (ref 0–6)
NIGHT BLUE STAIN TISS: SIGNIFICANT CHANGE UP
NRBC NFR CSF: 8 /UL — HIGH (ref 0–5)
NRBC NFR CSF: 9 /UL — HIGH (ref 0–5)
POTASSIUM SERPL-MCNC: 3.4 MMOL/L — LOW (ref 3.5–5.3)
POTASSIUM SERPL-MCNC: 3.8 MMOL/L — SIGNIFICANT CHANGE UP (ref 3.5–5.3)
POTASSIUM SERPL-SCNC: 3.4 MMOL/L — LOW (ref 3.5–5.3)
POTASSIUM SERPL-SCNC: 3.8 MMOL/L — SIGNIFICANT CHANGE UP (ref 3.5–5.3)
PROCALCITONIN SERPL-MCNC: 0.07 NG/ML — SIGNIFICANT CHANGE UP (ref 0.02–0.1)
PROT CSF-MCNC: 56 MG/DL — HIGH (ref 15–45)
RBC # CSF: 2 /UL — HIGH (ref 0–0)
RBC # CSF: 39 /UL — HIGH (ref 0–0)
RSV RNA NPH QL NAA+NON-PROBE: SIGNIFICANT CHANGE UP
SARS-COV-2 RNA SPEC QL NAA+PROBE: SIGNIFICANT CHANGE UP
SODIUM SERPL-SCNC: 132 MMOL/L — LOW (ref 135–145)
SODIUM SERPL-SCNC: 140 MMOL/L — SIGNIFICANT CHANGE UP (ref 135–145)
SPECIMEN SOURCE: SIGNIFICANT CHANGE UP
SPECIMEN SOURCE: SIGNIFICANT CHANGE UP
TUBE TYPE: SIGNIFICANT CHANGE UP
TUBE TYPE: SIGNIFICANT CHANGE UP

## 2025-01-04 PROCEDURE — 70496 CT ANGIOGRAPHY HEAD: CPT | Mod: 26,MC

## 2025-01-04 PROCEDURE — 70498 CT ANGIOGRAPHY NECK: CPT | Mod: 26,MC

## 2025-01-04 PROCEDURE — 99223 1ST HOSP IP/OBS HIGH 75: CPT

## 2025-01-04 PROCEDURE — 99283 EMERGENCY DEPT VISIT LOW MDM: CPT

## 2025-01-04 PROCEDURE — 99223 1ST HOSP IP/OBS HIGH 75: CPT | Mod: GC

## 2025-01-04 PROCEDURE — 70450 CT HEAD/BRAIN W/O DYE: CPT | Mod: 26,59,MC

## 2025-01-04 PROCEDURE — 70553 MRI BRAIN STEM W/O & W/DYE: CPT | Mod: 26

## 2025-01-04 RX ORDER — DEXTROSE MONOHYDRATE 25 G/50ML
15 INJECTION, SOLUTION INTRAVENOUS ONCE
Refills: 0 | Status: DISCONTINUED | OUTPATIENT
Start: 2025-01-04 | End: 2025-01-09

## 2025-01-04 RX ORDER — GLUCAGON INJECTION, SOLUTION 0.5 MG/.1ML
1 INJECTION, SOLUTION SUBCUTANEOUS ONCE
Refills: 0 | Status: DISCONTINUED | OUTPATIENT
Start: 2025-01-04 | End: 2025-01-09

## 2025-01-04 RX ORDER — ENOXAPARIN SODIUM 60 MG/.6ML
40 INJECTION INTRAVENOUS; SUBCUTANEOUS EVERY 24 HOURS
Refills: 0 | Status: DISCONTINUED | OUTPATIENT
Start: 2025-01-04 | End: 2025-01-05

## 2025-01-04 RX ORDER — VANCOMYCIN HYDROCHLORIDE 5 G/100ML
1500 INJECTION, POWDER, LYOPHILIZED, FOR SOLUTION INTRAVENOUS EVERY 12 HOURS
Refills: 0 | Status: DISCONTINUED | OUTPATIENT
Start: 2025-01-04 | End: 2025-01-06

## 2025-01-04 RX ORDER — PIPERACILLIN AND TAZOBACTAM 3; .375 G/15ML; G/15ML
3.38 INJECTION, POWDER, LYOPHILIZED, FOR SOLUTION INTRAVENOUS EVERY 8 HOURS
Refills: 0 | Status: DISCONTINUED | OUTPATIENT
Start: 2025-01-04 | End: 2025-01-04

## 2025-01-04 RX ORDER — INSULIN LISPRO 100/ML
VIAL (ML) SUBCUTANEOUS
Refills: 0 | Status: DISCONTINUED | OUTPATIENT
Start: 2025-01-04 | End: 2025-01-04

## 2025-01-04 RX ORDER — INSULIN LISPRO 100/ML
VIAL (ML) SUBCUTANEOUS AT BEDTIME
Refills: 0 | Status: DISCONTINUED | OUTPATIENT
Start: 2025-01-04 | End: 2025-01-04

## 2025-01-04 RX ORDER — SODIUM CHLORIDE 9 MG/ML
1000 INJECTION, SOLUTION INTRAVENOUS
Refills: 0 | Status: DISCONTINUED | OUTPATIENT
Start: 2025-01-04 | End: 2025-01-06

## 2025-01-04 RX ORDER — DEXTROSE MONOHYDRATE 25 G/50ML
12.5 INJECTION, SOLUTION INTRAVENOUS ONCE
Refills: 0 | Status: DISCONTINUED | OUTPATIENT
Start: 2025-01-04 | End: 2025-01-09

## 2025-01-04 RX ORDER — SODIUM CHLORIDE 9 MG/ML
1000 INJECTION, SOLUTION INTRAVENOUS
Refills: 0 | Status: DISCONTINUED | OUTPATIENT
Start: 2025-01-04 | End: 2025-01-09

## 2025-01-04 RX ORDER — DEXTROSE MONOHYDRATE 25 G/50ML
25 INJECTION, SOLUTION INTRAVENOUS ONCE
Refills: 0 | Status: DISCONTINUED | OUTPATIENT
Start: 2025-01-04 | End: 2025-01-09

## 2025-01-04 RX ORDER — NIFEDIPINE 60 MG/1
60 TABLET, EXTENDED RELEASE ORAL DAILY
Refills: 0 | Status: DISCONTINUED | OUTPATIENT
Start: 2025-01-04 | End: 2025-01-09

## 2025-01-04 RX ORDER — CEFTRIAXONE SODIUM 1 G/1
2000 INJECTION, POWDER, FOR SOLUTION INTRAMUSCULAR; INTRAVENOUS EVERY 12 HOURS
Refills: 0 | Status: DISCONTINUED | OUTPATIENT
Start: 2025-01-04 | End: 2025-01-07

## 2025-01-04 RX ORDER — GINKGO BILOBA 40 MG
3 CAPSULE ORAL AT BEDTIME
Refills: 0 | Status: DISCONTINUED | OUTPATIENT
Start: 2025-01-04 | End: 2025-01-09

## 2025-01-04 RX ORDER — INSULIN LISPRO 100/ML
10 VIAL (ML) SUBCUTANEOUS ONCE
Refills: 0 | Status: COMPLETED | OUTPATIENT
Start: 2025-01-04 | End: 2025-01-04

## 2025-01-04 RX ORDER — INSULIN LISPRO 100/ML
VIAL (ML) SUBCUTANEOUS EVERY 4 HOURS
Refills: 0 | Status: DISCONTINUED | OUTPATIENT
Start: 2025-01-04 | End: 2025-01-06

## 2025-01-04 RX ORDER — INSULIN GLARGINE-YFGN 100 [IU]/ML
10 INJECTION, SOLUTION SUBCUTANEOUS AT BEDTIME
Refills: 0 | Status: DISCONTINUED | OUTPATIENT
Start: 2025-01-04 | End: 2025-01-05

## 2025-01-04 RX ORDER — HYDRALAZINE HYDROCHLORIDE 10 MG/1
10 TABLET ORAL
Refills: 0 | Status: DISCONTINUED | OUTPATIENT
Start: 2025-01-04 | End: 2025-01-06

## 2025-01-04 RX ORDER — POTASSIUM CHLORIDE 600 MG/1
40 TABLET, FILM COATED, EXTENDED RELEASE ORAL ONCE
Refills: 0 | Status: COMPLETED | OUTPATIENT
Start: 2025-01-04 | End: 2025-01-04

## 2025-01-04 RX ADMIN — CEFTRIAXONE SODIUM 100 MILLIGRAM(S): 1 INJECTION, POWDER, FOR SOLUTION INTRAMUSCULAR; INTRAVENOUS at 18:41

## 2025-01-04 RX ADMIN — Medication 10 UNIT(S): at 21:51

## 2025-01-04 RX ADMIN — PIPERACILLIN AND TAZOBACTAM 25 GRAM(S): 3; .375 INJECTION, POWDER, LYOPHILIZED, FOR SOLUTION INTRAVENOUS at 17:36

## 2025-01-04 RX ADMIN — SODIUM CHLORIDE 100 MILLILITER(S): 9 INJECTION, SOLUTION INTRAVENOUS at 18:40

## 2025-01-04 RX ADMIN — POTASSIUM CHLORIDE 40 MILLIEQUIVALENT(S): 600 TABLET, FILM COATED, EXTENDED RELEASE ORAL at 20:03

## 2025-01-04 RX ADMIN — VANCOMYCIN HYDROCHLORIDE 300 MILLIGRAM(S): 5 INJECTION, POWDER, LYOPHILIZED, FOR SOLUTION INTRAVENOUS at 20:01

## 2025-01-04 RX ADMIN — HYDRALAZINE HYDROCHLORIDE 10 MILLIGRAM(S): 10 TABLET ORAL at 18:40

## 2025-01-04 RX ADMIN — PIPERACILLIN AND TAZOBACTAM 25 GRAM(S): 3; .375 INJECTION, POWDER, LYOPHILIZED, FOR SOLUTION INTRAVENOUS at 09:22

## 2025-01-04 RX ADMIN — Medication 170 MILLIGRAM(S): at 20:12

## 2025-01-04 RX ADMIN — INSULIN GLARGINE-YFGN 10 UNIT(S): 100 INJECTION, SOLUTION SUBCUTANEOUS at 21:55

## 2025-01-04 RX ADMIN — NIFEDIPINE 60 MILLIGRAM(S): 60 TABLET, EXTENDED RELEASE ORAL at 12:38

## 2025-01-04 NOTE — ED PROVIDER NOTE - ATTENDING CONTRIBUTION TO CARE
Hx: pt with AMS, fever, transferred from outside ER for concern for abnormal ct reading with possible SAH.      PE: nontoxic appearing, no respiratory distress, no meningeal signs, awake, alert, clear lungs, RRR, ab soft, nt, no rash.    MDM: fever, AMS, concern for toxic encephalopathy, check cbc r/o anemia or leukocytosis, check bmp to r/o metabolic derangement and lyte imbalance, LP r/o encephalitis

## 2025-01-04 NOTE — H&P ADULT - NSHPREVIEWOFSYSTEMS_GEN_ALL_CORE

## 2025-01-04 NOTE — H&P ADULT - PROBLEM SELECTOR PLAN 1
Concerning for meningitis - fever, HA, +nuchal rigidity and a + brudzinski's sign on neurology's exam.  - s/p vancomycin and Zosyn prior to transfer   - LP was performed and csf studies show an glucose 258, elevated proteins 56 and TNC of 9 with neutrophilic predominance.   - CSF Culture pending    Plan:  - Start CTX 2g, Vancomycin, and Acyclovir with fluids  -  Pending HSV 1/2 PCR, borrelia, EBV PCR, GENEVIEVE virus DNA  - ID consulted  - MRI brain w wo contrast  - Neuro check q4

## 2025-01-04 NOTE — ED PROVIDER NOTE - CLINICAL SUMMARY MEDICAL DECISION MAKING FREE TEXT BOX
63yo M transferred from [facility] for eval for encephalitis and subdural hematoma. Patient was brought in to  for AMS, headache, and found to be febrile there. CTH revealed a small atraumatic subdural hematoma and transferred to [facility] for neurosurgical intervention. Antibiosed with vanc and zosyn and s/p 2-3L fluid. Patient denies any chest pain, shortness of breath, back pain, abdominal pain, nausea, vomiting.    Vitals nonactionable - hypertensive, low grade fever    Intermittently confused, able to follow commands, move extremities    Given hx of AMS, headache, and fever, concern for encephalitis. Will obtain screening labs, lumbar puncture, neurosurgical consult for subdural hematoma, likely tba

## 2025-01-04 NOTE — ED ADULT NURSE REASSESSMENT NOTE - NS ED NURSE REASSESS COMMENT FT1
Patient is A+OX2 (disoriented to place), laying down in stretcher. Reports "feeling cold and c/o mild head pain. Denies any other pain/discomfort. Breathing is spontaneous and unlabored on room air. Skin warm dry and of color appropriate for ethnicity. Wife at bedside. Plan of care explained. Pending dispo.

## 2025-01-04 NOTE — H&P ADULT - ATTENDING COMMENTS
patient is a 64 year old male with past medical history of hypertension, transferred from OSH, initially presented with headache, fever, lethargy, change in speech, ams. OSH  CTH reportedly with artefact vs trace falcine SDH, repeat CTH wnl. s/p LP done in the ED.     #concern for infectious meningitis   #uncontrolled hyperglycemia, DM     - empiric treatment for meningitis : order stat ceftriaxone, vancomycin, acyclovir. continuous IVF while on acyclovir.  - consult ID.  - per neuro/neurosx no concern for SDH, cleared for pharm dvt ppx.   - MRI brain - pending  - f/u CSF studies pending cx, HSV, borrelia, EBV, JCV.  - check utox, ammonia.  - hypertension uncontrolled- continue home med nifedipine. start hydralazine po.  - SS eval. strict aspiration precautions. neuro checks.   - A1C 11.8. check BHB. start DM order set. start ISS and calculate need for basal/bolus insulin. fingerstick glucose checks ACHS/Q6H. hypoglycemia protocol prn.   - check stat fingerstick, if fingerstick glu >250, then start long acting insulin tonight 10units HS.   - inpatient endo consult.   - will need DM education, close outpatient f/u.   - check TSH, lipid panel.  - replace K with supplement, keep K ~4. monitor BMP. patient is a 64 year old male with past medical history of hypertension, transferred from OSH, initially presented with headache, fever, lethargy, change in speech, ams. OSH  CTH reportedly with artefact vs trace falcine SDH, repeat CTH wnl. s/p LP done in the ED.     #concern for infectious meningitis   #uncontrolled hyperglycemia, DM     - empiric treatment for meningitis : order stat ceftriaxone, vancomycin, acyclovir. continuous IVF while on acyclovir. consider adding ampicillin pending ID recs.   - consult ID.  - per neuro/neurosx no concern for SDH, cleared for pharm dvt ppx.   - MRI brain - pending  - f/u CSF studies pending cx, HSV, borrelia, EBV, JCV.  - check utox, ammonia.  - hypertension uncontrolled- continue home med nifedipine. start hydralazine po.  - SS eval. strict aspiration precautions. neuro checks.   - A1C 11.8. check BHB. start DM order set. start ISS and calculate need for basal/bolus insulin. fingerstick glucose checks ACHS/Q6H. hypoglycemia protocol prn.   - check stat fingerstick, if fingerstick glu >250, then start long acting insulin tonight 10units HS.   - inpatient endo consult.   - will need DM education, close outpatient f/u.   - check TSH, lipid panel.  - replace K with supplement, keep K ~4. monitor BMP.

## 2025-01-04 NOTE — ED PROVIDER NOTE - SHIFT CHANGE DETAILS
Patient signed out to me pending repeat VBG and BMP as there was a gap on the first metabolic panel.  There is no evidence of acidosis, patient to be admitted as per neurology recommendations.

## 2025-01-04 NOTE — CONSULT NOTE ADULT - ASSESSMENT
63 y/o male who was previously healthy with a h/o HTN  presents as a transfer from [facility], history is obtained from the wife who is at bedside. She reports that he has been lethargic since 4 days,  then 2 days ago became febrile and became increasingly confused and altered which is when family decided to take him to [facility]. Wife states he works for the "[employer] and is fully functional at baseline. He has no underlying medical conditions besides hypertension for which he takes medications. While at [facility] patient was reportedly found to have a SDH, the imaging or arrival to [facility] was repeated and this was not seen. Per radiology this may have been artifact as the follow up scan does not show this. febrile to 102.7 and finger stick glucose was 398 upon chart review of the [facility] paper work. Additionally the patient was started on vancomycin and Zosyn prior to transfer. Upon arrival to [facility] patient had a LP and csf studies show an elevated glucose, elevated proteins and TNC of 9 with neutrophilic predominance. On exam found to have nuchal rigidity and a + brudzinski's sign.  No focal neurologic deficits noted.       Impression: Altered mental status, headache and fever with a nuchal rigidity, positive Brudzinski's sign on exam. Labs notable for elevated glucose, proteins and TBC with neutrophilic predominance. Presentation concerning for infectious meningitis.    Recommendations:   [] Admit to medicine with close ID follow up  [] MRI brain w wo contrast  [] LP done in the ED on arrival  Results thus far demonstrate CSF glusose (258), CSF Proteins (56), TNC 9 with neutrophilic predominance, CSF PCR is negative  pending HSV 1/2 PCR, borrelia, EBV PCR, GENEVIEVE virus DNA  [] Please check utox, ammonia levels  [] Continue Zosyn and vancomycin  [] ID consult in the AM  [] IV hydration  [] PT/OT as able      Case d/w neurology attending Dr Grant

## 2025-01-04 NOTE — ED ADULT NURSE REASSESSMENT NOTE - NS ED NURSE REASSESS COMMENT FT1
Received report from LIS Chakraborty RN. Patient presenting as transfer from [facility] for possible head bleed. However, not observed on repeat CT imaging. Patient with hx of AMS/lethargy. AOx4 and lethargic with family at bedside. Patient admitted to medicine, pending bed at this time. MRI scheduled for 2000, MRI safety screening provided to family. Pt placed in position of comfort. Bed in lowest position, wheels locked, appropriate side rails raised. Pt denies needs at this time. Received report from LIS Chakraborty RN. Patient presenting as transfer from [facility] for possible head bleed. However, not observed on repeat CT imaging. Patient with hx of AMS/lethargy. AOx4 and lethargic with family at bedside. Patient admitted to medicine, pending bed at this time. MRI scheduled for 2000, MRI safety screening provided to family. Patient noted to be hypertensive, upon arrival. Current pressure 180/99, ACP Liz Cassidy made aware, will continue to monitor. Pt placed in position of comfort. Bed in lowest position, wheels locked, appropriate side rails raised. Pt denies needs at this time.

## 2025-01-04 NOTE — ED ADULT NURSE REASSESSMENT NOTE - NS ED NURSE REASSESS COMMENT FT1
ACP Liz Cassidy aware of patient persistent hypertension. No nursing interventions needed at this time. Will continue to monitor. Per ACP to discuss with attending physician for further evaluation.

## 2025-01-04 NOTE — CONSULT NOTE ADULT - ASSESSMENT
64M no ACAP. Hx HTN, prior ACDF w/ OSH Ortho 2023. xfer [facility] for AMS. CTH w/ artefact vs trace falcine SDH, rpt CTH wnl. Increased thirst, incr urination x1wk, finger stick 398, Na 134. Febrile 100.9. no trauma. Exam: Ox2, mildly confused on place (kept saying "we're on [place], why are you asking?" but knew name, year, why he was there ("because I'm confused!")  -no nsgy intervention  -adm Neuro vs med for AMS w/u. Susp diabetes  -outpt nsgy f/u prn

## 2025-01-04 NOTE — ED PROVIDER NOTE - PROGRESS NOTE DETAILS
Bernice Yadav MD (PGY-3 EM): discussed w/ patient and family at bedside, not on any diabetic medication, no history of dka. only history of htn. pending vbg and rpt bmp will admit to medicine

## 2025-01-04 NOTE — H&P ADULT - HISTORY OF PRESENT ILLNESS
65 y/o male with pmhx of HTN, prior Anterior cervical discectomy and fusion w/ OSH Ortho 2023 presents as a transfer from [facility] for AMS and encephalitis. Patient has been lethargic x 4 days. Also developed fever with worsening confusion x 2 days.     While at [facility] patient was reportedly found to have a SDH, the imaging or arrival to [facility] was repeated and this was not seen. Per radiology this may have been artifact as the follow up scan does not show this. febrile to 102.7 and finger stick glucose was 398 upon chart review of the [facility] paper work. Additionally the patient was started on vancomycin and Zosyn prior to transfer. Upon arrival to [facility] ED patient had a LP and csf studies show an elevated glucose, elevated proteins and TNC of 9 with neutrophilic predominance.    She does report that he travelled to [facility] 3 months ago but returned from there in good health and did not have any additional infections. 65 y/o male with pmhx of HTN, prior Anterior cervical discectomy and fusion w/ OSH Ortho 2023 presents as a transfer from [facility] for AMS and encephalitis. Patient has been lethargic x 4 days. Also developed fever with worsening confusion x 2 days.    While at [facility] patient was reportedly found to have a SDH, the imaging or arrival to [facility] was repeated and this was not seen. Per radiology this may have been artifact as the follow up scan does not show this. febrile to 102.7 and finger stick glucose was 398 upon chart review of the [facility] paper work. Additionally the patient was started on vancomycin and Zosyn prior to transfer. Upon arrival to [facility] ED patient had a LP and csf studies show an elevated glucose, elevated proteins and TNC of 9 with neutrophilic predominance.    She does report that he travelled to [facility] 3 months ago but returned from there in good health and did not have any additional infections.

## 2025-01-04 NOTE — H&P ADULT - NSHPPHYSICALEXAM_GEN_ALL_CORE
LOS:     VITALS:   T(C): 36.9 (01-04-25 @ 17:15), Max: 38.3 (01-03-25 @ 22:31)  HR: 68 (01-04-25 @ 17:15) (62 - 84)  BP: 170/110 (01-04-25 @ 17:15) (169/101 - 189/99)  RR: 18 (01-04-25 @ 17:15) (14 - 20)  SpO2: 98% (01-04-25 @ 17:15) (96% - 99%)    GENERAL: NAD  HEAD:  Atraumatic, Normocephalic  EYES: EOMI, PERRLA, conjunctiva and sclera clear  ENT: Moist mucous membranes  NECK: No elevated JVP.  CHEST/LUNG: Clear to auscultation bilaterally; No rales, rhonchi, or wheezes.   HEART: Regular rate and rhythm; No murmurs, rubs, or gallops  ABDOMEN: Bowel sounds present; Soft, nontender, nondistended  EXTREMITIES:  2+ Peripheral Pulses, brisk capillary refill. No clubbing, cyanosis, or edema  NERVOUS SYSTEM:  A&Ox3, no focal deficits, full strength and sensation, slow speech, mild to moderate aphasia   SKIN: No rashes or lesions

## 2025-01-04 NOTE — ED ADULT NURSE REASSESSMENT NOTE - NS ED NURSE REASSESS COMMENT FT1
ED RN assessing pt, pt much less alerted/ confused than initial arrival, Pt alert and oriented x4, repositioned in bed, vitals recorded and documented. No further interventions at this time.

## 2025-01-04 NOTE — H&P ADULT - NSHPLABSRESULTS_GEN_ALL_CORE
.  LABS:                         13.9   11.04 )-----------( 270      ( 03 Jan 2025 22:59 )             40.5     01-04    140  |  101  |  12  ----------------------------<  269[H]  3.4[L]   |  19[L]  |  1.07    Ca    8.6      04 Jan 2025 06:59    TPro  7.5  /  Alb  3.9  /  TBili  0.6  /  DBili  x   /  AST  29  /  ALT  24  /  AlkPhos  87  01-03    PT/INR - ( 03 Jan 2025 22:59 )   PT: 12.6 sec;   INR: 1.10 ratio         PTT - ( 03 Jan 2025 22:59 )  PTT:25.3 sec  Urinalysis Basic - ( 04 Jan 2025 06:59 )    Color: x / Appearance: x / SG: x / pH: x  Gluc: 269 mg/dL / Ketone: x  / Bili: x / Urobili: x   Blood: x / Protein: x / Nitrite: x   Leuk Esterase: x / RBC: x / WBC x   Sq Epi: x / Non Sq Epi: x / Bacteria: x            RADIOLOGY, EKG & ADDITIONAL TESTS: Reviewed.

## 2025-01-04 NOTE — H&P ADULT - ASSESSMENT
CTX, Vanc, acyclovir  ID consult  MRI  acyclovir   iv fluid    neuro check  fall/asp precaution     subdural    dvt ppx    speech and swallow  PT/OT    HTN  nifedipine 60mg daily  hydralazine PO BID       65 y/o male with h/o of HTN  presents as a transfer from [facility] for fever and headache. In CTH at [facility], patient was reportedly found to have a SDH, the imaging or arrival to [facility] was repeated and this was not seen - confirmed with neurology/neurosurgery of no subdural hematoma. Per radiology this may have been artifact as the follow up scan does not show this. s/p vancomycin and Zosyn prior to transfer and medications changed to CTX 2g, Vancomycin, and Acyclovir. On neuro exam, found to have nuchal rigidity and a + brudzinski's sign.  No focal neurologic deficits noted. LP was performed and csf studies show an elevated glucose, elevated proteins and TNC of 9 with neutrophilic predominance. Pending culture results.  ID consulted. MRI pending.

## 2025-01-04 NOTE — CONSULT NOTE ADULT - SUBJECTIVE AND OBJECTIVE BOX
p (1480)     HPI:  64M no ACAP. Hx HTN, prior ACDF w/ OSH Ortho 2023. xfer [facility] for AMS. CTH w/ artefact vs trace falcine SDH, rpt CTH wnl. Increased thirst, incr urination x1wk, finger stick 398, Na 134. Febrile 100.9. no trauma. Exam: Ox2, mildly confused on place (kept saying "we're on [place], why are you asking?" but knew name, year, why he was there ("because I'm confused!")  =====================  PAST MEDICAL HISTORY     PAST SURGICAL HISTORY     Cipro (Unknown)      MEDICATIONS:  Antibiotics:    Neuro:    Other:      SOCIAL HISTORY:   Occupation:   Marital Status:     FAMILY HISTORY:      ROS: Negative except per HPI    LABS:  PT/INR - ( 03 Jan 2025 22:59 )   PT: 12.6 sec;   INR: 1.10 ratio         PTT - ( 03 Jan 2025 22:59 )  PTT:25.3 sec                        13.9   11.04 )-----------( 270      ( 03 Jan 2025 22:59 )             40.5     01-03    139  |  101  |  14  ----------------------------<  323[H]  3.6   |  18[L]  |  1.09    Ca    8.4      03 Jan 2025 22:59    TPro  7.5  /  Alb  3.9  /  TBili  0.6  /  DBili  x   /  AST  29  /  ALT  24  /  AlkPhos  87  01-03

## 2025-01-04 NOTE — CONSULT NOTE ADULT - SUBJECTIVE AND OBJECTIVE BOX
Neurology - Consult Note    Spectra: [phone] ([facility]), [phone] ([facility])    HPI: 63 y/o male who was previously healthy with a h/o HTN  presents as a transfer from [facility], history is obtained from the wife who is at bedside. She reports that he has been lethargic since 4 days,  then 2 days ago became febrile and became increasingly confused and altered which is when family decided to take him to [facility]. Wife states he works for the [employer] and is fully functional at baseline. He has no underlying medical conditions besides hypertension for which he takes medications. While at [facility] patient was reportedly found to have a SDH, the imaging or arrival to [facility] was repeated and this was not seen. Per radiology this may have been artifact as the follow up scan does not show this. febrile to 102.7 and finger stick glucose was 398 upon chart review of the [facility] paper work. Additionally the patient was started on vancomycin and Zosyn prior to transfer. Upon arrival to [facility] ED patient had a LP and csf studies show an elevated glucose, elevated proteins and TNC of 9 with neutrophilic predominance.     She does report that he travelled to [facility] 3 months ago but returned from there in good health and did not have any additional infections.   Per ED attending this transfer was accepted by [facility] neurology attending for overnight transfer.       Review of Systems:    CONSTITUTIONAL: + fevers or chills  EYES AND ENT: No visual changes or no throat pain   NECK: No pain or stiffness  RESPIRATORY: No shortness of breath  CARDIOVASCULAR: No chest pain or palpitations  GASTROINTESTINAL: No nausea or vomiting   GENITOURINARY: No dysuria  NEUROLOGICAL: +As stated in HPI above  SKIN: No itching, burning, rashes, or lesions   All other review of systems is negative unless indicated above.    Allergies:  Cipro (Unknown)      PMHx/PSHx/Family Hx: As above, otherwise see below       Social Hx:  Never smoker; no current use of tobacco, alcohol, or illicit drugs  Lives with wife; occupation [employer] , baseline functional status is fully independent with ADls    Medications:  MEDICATIONS  (STANDING):    MEDICATIONS  (PRN):      Vitals:  T(C): 37.4 (01-04-25 @ 05:36), Max: 38.3 (01-03-25 @ 22:31)  HR: 64 (01-04-25 @ 05:36) (63 - 84)  BP: 189/99 (01-04-25 @ 05:36) (169/101 - 189/99)  RR: 18 (01-04-25 @ 05:36) (14 - 20)  SpO2: 99% (01-04-25 @ 05:36) (96% - 99%)    Physical Examination:   General - non-toxic appearing male in no acute distress  Cardiovascular - peripheral pulses palpable, no edema  Respiratory - breathing comfortably with no increased work of breathing    Neurologic Exam:  Mental status - Initially asleep but arouse to verbal stimulus, Alert, Oriented to person, place, and time, patient is able to state the current month, able to say hes at the hospital, knows im the doctor and his wife is at bedside.  Speaking in short sentences but fluently Follows simple and complex commands, follows cross body commands.     Positive brudzinski's sign, + nuchal rigidity    Cranial nerves - PERRLA (4mm -> 3mm b/l), VFF, EOMI, face sensation (V1-V3) intact b/l, facial strength intact without asymmetry b/l, hearing intact b/l, palate with symmetric elevation, tongue midline on protrusion with full lateral movement    Motor - Normal bulk and tone throughout. No pronator drift.    Strength testing            Deltoid      Biceps      Triceps     Wrist Extension    Wrist Flexion     Interossei         R            5                 5               5                     5                              5                        5                 5  L             5                 5               5                     5                              5                        5                 5              Hip Flexion    Hip Extension    Knee Flexion    Knee Extension    Dorsiflexion    Plantar Flexion  R              5                         5                       5                           5                            5                          5  L              5                         5                        5                           5                            5                          5    Sensation - Light touch intact throughout    DTR's -             Biceps      Triceps     Brachioradialis      Patellar    Ankle    Toes/plantar response  R             2+             2+                  2+                       2+            2+                 Down  L              2+             2+                 2+                        2+           2+                 Down    Coordination - Finger to Nose intact b/l. No tremors appreciated    Gait and station - Not assessed    Labs:                        13.9   11.04 )-----------( 270      ( 03 Jan 2025 22:59 )             40.5     01-03    139  |  101  |  14  ----------------------------<  323[H]  3.6   |  18[L]  |  1.09    Ca    8.4      03 Jan 2025 22:59    TPro  7.5  /  Alb  3.9  /  TBili  0.6  /  DBili  x   /  AST  29  /  ALT  24  /  AlkPhos  87  01-03    CAPILLARY BLOOD GLUCOSE      POCT Blood Glucose.: 290 mg/dL (03 Jan 2025 22:45)    LIVER FUNCTIONS - ( 03 Jan 2025 22:59 )  Alb: 3.9 g/dL / Pro: 7.5 g/dL / ALK PHOS: 87 U/L / ALT: 24 U/L / AST: 29 U/L / GGT: x             Culture - CSF with Gram Stain (collected 03 Jan 2025 23:29)  Source: .CSF CSF  Gram Stain (04 Jan 2025 03:31):    polymorphonuclear leukocytes seen    No organisms seen    by cytocentrifuge      PT/INR - ( 03 Jan 2025 22:59 )   PT: 12.6 sec;   INR: 1.10 ratio         PTT - ( 03 Jan 2025 22:59 )  PTT:25.3 sec  CSF:    Total Nucleated Cell Count, CSF: 9 /uL (01-03-25 @ 23:55)  RBC Count - Spinal Fluid: 2 /uL (01-03-25 @ 23:55)  Total Nucleated Cell Count, CSF: 8 /uL (01-03-25 @ 23:29)  RBC Count - Spinal Fluid: 39 /uL (01-03-25 @ 23:29)      Protein, CSF: 56 mg/dL (01-03-25 @ 23:29)        Radiology:  CT Head No Cont:  (04 Jan 2025 00:36)  1/4/2024  IMPRESSION:    CT HEAD:  Previously seen thin parafalcine subdural hemorrhage measuring up to 2 mm   is not visualized in this CT, and favored to reflect artifact on the   prior study.    No clear evidence of acute intracranial hemorrhage. No significant mass   effect or midline shift.    CTA NECK:  No evidence of significant stenosis or occlusion.    CTA HEAD:  No proximal large vessel occlusion.    Luminal irregularity of the bilateral vertebral artery V4 segments with   several short segment of at least moderate focal luminal narrowing, which   may be secondary to atherosclerotic disease versus   vasculopathy/vasculitis.   Neurology - Consult Note    Spectra: [phone] ([facility]), [phone] ([facility])    HPI: 65 y/o male who was previously healthy with a h/o HTN  presents as a transfer from [facility], history is obtained from the wife who is at bedside. She reports that he has been lethargic since 4 days,  then 2 days ago became febrile and became increasingly confused and altered which is when family decided to take him to [facility]. Wife states he works for the [employer] and is fully functional at baseline. He has no underlying medical conditions besides hypertension for which he takes medications. While at [facility] patient was reportedly found to have a SDH, the imaging or arrival to [facility] was repeated and this was not seen. Per radiology this may have been artifact as the follow up scan does not show this. febrile to 102.7 and finger stick glucose was 398 upon chart review of the [facility] paper work. Additionally the patient was started on vancomycin and Zosyn prior to transfer. Upon arrival to [facility] patient had a LP and csf studies show an elevated glucose, elevated proteins and TNC of 9 with neutrophilic predominance.     She does report that he travelled to [facility] 3 months ago but returned from there in good health and did not have any additional infections.         Review of Systems:    CONSTITUTIONAL: + fevers or chills  EYES AND ENT: No visual changes or no throat pain   NECK: No pain or stiffness  RESPIRATORY: No shortness of breath  CARDIOVASCULAR: No chest pain or palpitations  GASTROINTESTINAL: No nausea or vomiting   GENITOURINARY: No dysuria  NEUROLOGICAL: +As stated in HPI above  SKIN: No itching, burning, rashes, or lesions   All other review of systems is negative unless indicated above.    Allergies:  Cipro (Unknown)      PMHx/PSHx/Family Hx: As above, otherwise see below       Social Hx:  Never smoker; no current use of tobacco, alcohol, or illicit drugs  Lives with wife; occupation [employer] , baseline functional status is fully independent with ADls    Medications:  MEDICATIONS  (STANDING):    MEDICATIONS  (PRN):      Vitals:  T(C): 37.4 (01-04-25 @ 05:36), Max: 38.3 (01-03-25 @ 22:31)  HR: 64 (01-04-25 @ 05:36) (63 - 84)  BP: 189/99 (01-04-25 @ 05:36) (169/101 - 189/99)  RR: 18 (01-04-25 @ 05:36) (14 - 20)  SpO2: 99% (01-04-25 @ 05:36) (96% - 99%)    Physical Examination:   General - non-toxic appearing male in no acute distress  Cardiovascular - peripheral pulses palpable, no edema  Respiratory - breathing comfortably with no increased work of breathing    Neurologic Exam:  Mental status - Initially asleep but arouse to verbal stimulus, Alert, Oriented to person, place, and time, patient is able to state the current month, able to say hes at the hospital, knows im the doctor and his wife is at bedside.  Speaking in short sentences but fluently Follows simple and complex commands, follows cross body commands.     Positive brudzinski's sign, + nuchal rigidity    Cranial nerves - PERRLA (4mm -> 3mm b/l), VFF, EOMI, face sensation (V1-V3) intact b/l, facial strength intact without asymmetry b/l, hearing intact b/l, palate with symmetric elevation, tongue midline on protrusion with full lateral movement    Motor - Normal bulk and tone throughout. No pronator drift.    Strength testing            Deltoid      Biceps      Triceps     Wrist Extension    Wrist Flexion     Interossei         R            5                 5               5                     5                              5                        5                 5  L             5                 5               5                     5                              5                        5                 5              Hip Flexion    Hip Extension    Knee Flexion    Knee Extension    Dorsiflexion    Plantar Flexion  R              5                         5                       5                           5                            5                          5  L              5                         5                        5                           5                            5                          5    Sensation - Light touch intact throughout    DTR's -             Biceps      Triceps     Brachioradialis      Patellar    Ankle    Toes/plantar response  R             2+             2+                  2+                       2+            2+                 Down  L              2+             2+                 2+                        2+           2+                 Down    Coordination - Finger to Nose intact b/l. No tremors appreciated    Gait and station - Not assessed    Labs:                        13.9   11.04 )-----------( 270      ( 03 Jan 2025 22:59 )             40.5     01-03    139  |  101  |  14  ----------------------------<  323[H]  3.6   |  18[L]  |  1.09    Ca    8.4      03 Jan 2025 22:59    TPro  7.5  /  Alb  3.9  /  TBili  0.6  /  DBili  x   /  AST  29  /  ALT  24  /  AlkPhos  87  01-03    CAPILLARY BLOOD GLUCOSE      POCT Blood Glucose.: 290 mg/dL (03 Jan 2025 22:45)    LIVER FUNCTIONS - ( 03 Jan 2025 22:59 )  Alb: 3.9 g/dL / Pro: 7.5 g/dL / ALK PHOS: 87 U/L / ALT: 24 U/L / AST: 29 U/L / GGT: x             Culture - CSF with Gram Stain (collected 03 Jan 2025 23:29)  Source: .CSF CSF  Gram Stain (04 Jan 2025 03:31):    polymorphonuclear leukocytes seen    No organisms seen    by cytocentrifuge      PT/INR - ( 03 Jan 2025 22:59 )   PT: 12.6 sec;   INR: 1.10 ratio         PTT - ( 03 Jan 2025 22:59 )  PTT:25.3 sec  CSF:    Total Nucleated Cell Count, CSF: 9 /uL (01-03-25 @ 23:55)  RBC Count - Spinal Fluid: 2 /uL (01-03-25 @ 23:55)  Total Nucleated Cell Count, CSF: 8 /uL (01-03-25 @ 23:29)  RBC Count - Spinal Fluid: 39 /uL (01-03-25 @ 23:29)      Protein, CSF: 56 mg/dL (01-03-25 @ 23:29)        Radiology:  CT Head No Cont:  (04 Jan 2025 00:36)  1/4/2024  IMPRESSION:    CT HEAD:  Previously seen thin parafalcine subdural hemorrhage measuring up to 2 mm   is not visualized in this CT, and favored to reflect artifact on the   prior study.    No clear evidence of acute intracranial hemorrhage. No significant mass   effect or midline shift.    CTA NECK:  No evidence of significant stenosis or occlusion.    CTA HEAD:  No proximal large vessel occlusion.    Luminal irregularity of the bilateral vertebral artery V4 segments with   several short segment of at least moderate focal luminal narrowing, which   may be secondary to atherosclerotic disease versus   vasculopathy/vasculitis.

## 2025-01-04 NOTE — ED ADULT NURSE REASSESSMENT NOTE - [facility] ED NURSE REASSESS COMMENT FT1
War room calls to inform ED RN pts heart rate shot up to 150s for few seconds, back down to 60s in sinus rhythm, MD Matthews made aware and given print out of rhythm strip. No further interventions at this time.

## 2025-01-05 DIAGNOSIS — R51.9 HEADACHE, UNSPECIFIED: ICD-10-CM

## 2025-01-05 DIAGNOSIS — I25.10 ATHEROSCLEROTIC HEART DISEASE OF NATIVE CORONARY ARTERY WITHOUT ANGINA PECTORIS: ICD-10-CM

## 2025-01-05 DIAGNOSIS — E11.9 TYPE 2 DIABETES MELLITUS WITHOUT COMPLICATIONS: ICD-10-CM

## 2025-01-05 DIAGNOSIS — E78.5 HYPERLIPIDEMIA, UNSPECIFIED: ICD-10-CM

## 2025-01-05 LAB
A1C WITH ESTIMATED AVERAGE GLUCOSE RESULT: 11.8 % — HIGH (ref 4–5.6)
ALBUMIN SERPL ELPH-MCNC: 3.6 G/DL — SIGNIFICANT CHANGE UP (ref 3.3–5)
ALP SERPL-CCNC: 81 U/L — SIGNIFICANT CHANGE UP (ref 40–120)
ALT FLD-CCNC: 20 U/L — SIGNIFICANT CHANGE UP (ref 10–45)
ANION GAP SERPL CALC-SCNC: 14 MMOL/L — SIGNIFICANT CHANGE UP (ref 5–17)
AST SERPL-CCNC: 18 U/L — SIGNIFICANT CHANGE UP (ref 10–40)
B-OH-BUTYR SERPL-SCNC: 0.7 MMOL/L — HIGH
BASOPHILS # BLD AUTO: 0.04 K/UL — SIGNIFICANT CHANGE UP (ref 0–0.2)
BASOPHILS NFR BLD AUTO: 0.5 % — SIGNIFICANT CHANGE UP (ref 0–2)
BILIRUB SERPL-MCNC: 0.5 MG/DL — SIGNIFICANT CHANGE UP (ref 0.2–1.2)
BUN SERPL-MCNC: 12 MG/DL — SIGNIFICANT CHANGE UP (ref 7–23)
CALCIUM SERPL-MCNC: 8.9 MG/DL — SIGNIFICANT CHANGE UP (ref 8.4–10.5)
CHLORIDE SERPL-SCNC: 100 MMOL/L — SIGNIFICANT CHANGE UP (ref 96–108)
CO2 SERPL-SCNC: 22 MMOL/L — SIGNIFICANT CHANGE UP (ref 22–31)
CREAT SERPL-MCNC: 0.83 MG/DL — SIGNIFICANT CHANGE UP (ref 0.5–1.3)
EGFR: 99 ML/MIN/1.73M2 — SIGNIFICANT CHANGE UP
EOSINOPHIL # BLD AUTO: 0.06 K/UL — SIGNIFICANT CHANGE UP (ref 0–0.5)
EOSINOPHIL NFR BLD AUTO: 0.8 % — SIGNIFICANT CHANGE UP (ref 0–6)
ESTIMATED AVERAGE GLUCOSE: 292 MG/DL — HIGH (ref 68–114)
GLUCOSE BLDC GLUCOMTR-MCNC: 211 MG/DL — HIGH (ref 70–99)
GLUCOSE BLDC GLUCOMTR-MCNC: 226 MG/DL — HIGH (ref 70–99)
GLUCOSE BLDC GLUCOMTR-MCNC: 235 MG/DL — HIGH (ref 70–99)
GLUCOSE BLDC GLUCOMTR-MCNC: 277 MG/DL — HIGH (ref 70–99)
GLUCOSE BLDC GLUCOMTR-MCNC: 289 MG/DL — HIGH (ref 70–99)
GLUCOSE BLDC GLUCOMTR-MCNC: 300 MG/DL — HIGH (ref 70–99)
GLUCOSE BLDC GLUCOMTR-MCNC: 367 MG/DL — HIGH (ref 70–99)
GLUCOSE SERPL-MCNC: 260 MG/DL — HIGH (ref 70–99)
HCT VFR BLD CALC: 42.6 % — SIGNIFICANT CHANGE UP (ref 39–50)
HGB BLD-MCNC: 14.5 G/DL — SIGNIFICANT CHANGE UP (ref 13–17)
HSV1 DNA BLD QL NAA+PROBE: SIGNIFICANT CHANGE UP
HSV2 DNA BLD QL NAA+PROBE: SIGNIFICANT CHANGE UP
IMM GRANULOCYTES NFR BLD AUTO: 0.5 % — SIGNIFICANT CHANGE UP (ref 0–0.9)
LYMPHOCYTES # BLD AUTO: 2.15 K/UL — SIGNIFICANT CHANGE UP (ref 1–3.3)
LYMPHOCYTES # BLD AUTO: 26.9 % — SIGNIFICANT CHANGE UP (ref 13–44)
MAGNESIUM SERPL-MCNC: 2 MG/DL — SIGNIFICANT CHANGE UP (ref 1.6–2.6)
MCHC RBC-ENTMCNC: 28.2 PG — SIGNIFICANT CHANGE UP (ref 27–34)
MCHC RBC-ENTMCNC: 34 G/DL — SIGNIFICANT CHANGE UP (ref 32–36)
MCV RBC AUTO: 82.9 FL — SIGNIFICANT CHANGE UP (ref 80–100)
MONOCYTES # BLD AUTO: 0.83 K/UL — SIGNIFICANT CHANGE UP (ref 0–0.9)
MONOCYTES NFR BLD AUTO: 10.4 % — SIGNIFICANT CHANGE UP (ref 2–14)
NEUTROPHILS # BLD AUTO: 4.87 K/UL — SIGNIFICANT CHANGE UP (ref 1.8–7.4)
NEUTROPHILS NFR BLD AUTO: 60.9 % — SIGNIFICANT CHANGE UP (ref 43–77)
NRBC # BLD: 0 /100 WBCS — SIGNIFICANT CHANGE UP (ref 0–0)
PHOSPHATE SERPL-MCNC: 2.6 MG/DL — SIGNIFICANT CHANGE UP (ref 2.5–4.5)
PLATELET # BLD AUTO: 249 K/UL — SIGNIFICANT CHANGE UP (ref 150–400)
POTASSIUM SERPL-MCNC: 3.4 MMOL/L — LOW (ref 3.5–5.3)
POTASSIUM SERPL-SCNC: 3.4 MMOL/L — LOW (ref 3.5–5.3)
PROT SERPL-MCNC: 7.2 G/DL — SIGNIFICANT CHANGE UP (ref 6–8.3)
RBC # BLD: 5.14 M/UL — SIGNIFICANT CHANGE UP (ref 4.2–5.8)
RBC # FLD: 14 % — SIGNIFICANT CHANGE UP (ref 10.3–14.5)
SODIUM SERPL-SCNC: 136 MMOL/L — SIGNIFICANT CHANGE UP (ref 135–145)
SOURCE HSV 1/2: SIGNIFICANT CHANGE UP
VANCOMYCIN TROUGH SERPL-MCNC: 7.7 UG/ML — LOW (ref 10–20)
WBC # BLD: 7.99 K/UL — SIGNIFICANT CHANGE UP (ref 3.8–10.5)
WBC # FLD AUTO: 7.99 K/UL — SIGNIFICANT CHANGE UP (ref 3.8–10.5)

## 2025-01-05 PROCEDURE — 99233 SBSQ HOSP IP/OBS HIGH 50: CPT | Mod: GC

## 2025-01-05 PROCEDURE — 99255 IP/OBS CONSLTJ NEW/EST HI 80: CPT

## 2025-01-05 PROCEDURE — 99223 1ST HOSP IP/OBS HIGH 75: CPT | Mod: GC

## 2025-01-05 PROCEDURE — G0545: CPT

## 2025-01-05 RX ORDER — INSULIN LISPRO 100/ML
5 VIAL (ML) SUBCUTANEOUS
Refills: 0 | Status: DISCONTINUED | OUTPATIENT
Start: 2025-01-05 | End: 2025-01-06

## 2025-01-05 RX ORDER — POTASSIUM CHLORIDE 600 MG/1
40 TABLET, FILM COATED, EXTENDED RELEASE ORAL ONCE
Refills: 0 | Status: COMPLETED | OUTPATIENT
Start: 2025-01-05 | End: 2025-01-05

## 2025-01-05 RX ORDER — ATORVASTATIN CALCIUM 40 MG/1
80 TABLET, FILM COATED ORAL AT BEDTIME
Refills: 0 | Status: DISCONTINUED | OUTPATIENT
Start: 2025-01-05 | End: 2025-01-09

## 2025-01-05 RX ORDER — INSULIN GLARGINE-YFGN 100 [IU]/ML
20 INJECTION, SOLUTION SUBCUTANEOUS AT BEDTIME
Refills: 0 | Status: DISCONTINUED | OUTPATIENT
Start: 2025-01-05 | End: 2025-01-06

## 2025-01-05 RX ORDER — INSULIN GLARGINE-YFGN 100 [IU]/ML
10 INJECTION, SOLUTION SUBCUTANEOUS ONCE
Refills: 0 | Status: COMPLETED | OUTPATIENT
Start: 2025-01-05 | End: 2025-01-05

## 2025-01-05 RX ADMIN — VANCOMYCIN HYDROCHLORIDE 300 MILLIGRAM(S): 5 INJECTION, POWDER, LYOPHILIZED, FOR SOLUTION INTRAVENOUS at 17:15

## 2025-01-05 RX ADMIN — Medication 3: at 21:35

## 2025-01-05 RX ADMIN — CEFTRIAXONE SODIUM 100 MILLIGRAM(S): 1 INJECTION, POWDER, FOR SOLUTION INTRAMUSCULAR; INTRAVENOUS at 16:34

## 2025-01-05 RX ADMIN — CEFTRIAXONE SODIUM 100 MILLIGRAM(S): 1 INJECTION, POWDER, FOR SOLUTION INTRAMUSCULAR; INTRAVENOUS at 05:14

## 2025-01-05 RX ADMIN — Medication 170 MILLIGRAM(S): at 13:14

## 2025-01-05 RX ADMIN — HYDRALAZINE HYDROCHLORIDE 10 MILLIGRAM(S): 10 TABLET ORAL at 16:32

## 2025-01-05 RX ADMIN — INSULIN GLARGINE-YFGN 10 UNIT(S): 100 INJECTION, SOLUTION SUBCUTANEOUS at 10:09

## 2025-01-05 RX ADMIN — VANCOMYCIN HYDROCHLORIDE 300 MILLIGRAM(S): 5 INJECTION, POWDER, LYOPHILIZED, FOR SOLUTION INTRAVENOUS at 05:19

## 2025-01-05 RX ADMIN — HYDRALAZINE HYDROCHLORIDE 10 MILLIGRAM(S): 10 TABLET ORAL at 05:19

## 2025-01-05 RX ADMIN — Medication 5 UNIT(S): at 16:33

## 2025-01-05 RX ADMIN — Medication 2: at 11:30

## 2025-01-05 RX ADMIN — Medication 170 MILLIGRAM(S): at 05:19

## 2025-01-05 RX ADMIN — NIFEDIPINE 60 MILLIGRAM(S): 60 TABLET, EXTENDED RELEASE ORAL at 00:05

## 2025-01-05 RX ADMIN — POTASSIUM CHLORIDE 40 MILLIEQUIVALENT(S): 600 TABLET, FILM COATED, EXTENDED RELEASE ORAL at 08:17

## 2025-01-05 RX ADMIN — SODIUM CHLORIDE 100 MILLILITER(S): 9 INJECTION, SOLUTION INTRAVENOUS at 21:34

## 2025-01-05 RX ADMIN — Medication 2: at 06:15

## 2025-01-05 RX ADMIN — Medication 5: at 03:04

## 2025-01-05 RX ADMIN — Medication 3: at 07:44

## 2025-01-05 RX ADMIN — INSULIN GLARGINE-YFGN 20 UNIT(S): 100 INJECTION, SOLUTION SUBCUTANEOUS at 21:35

## 2025-01-05 RX ADMIN — Medication 3: at 16:32

## 2025-01-05 NOTE — PHYSICAL THERAPY INITIAL EVALUATION ADULT - GENERAL OBSERVATIONS, REHAB EVAL
Pt transferred from [facility] for eval for encephalitis and subdural hematoma. Pt was brought in to  for AMS, headache, and found to be febrile there. CTH revealed a small atraumatic subdural hematoma and transferred to [facility] for neurosurgical intervention, s/p LP 1/4, 1/4 CTH: mild subarachnoid/subdural blood. small chronic infarct and mild small vessel disease, +ID w/u with recent fevers/nuchal rigidity. Cleared by ERASMO Stevenson for PT eval. Pt received sitting EOB, +IVL, +spouse at bedside. Pt is awake, A&OX4, follows all commands 100% of the time.

## 2025-01-05 NOTE — CONSULT NOTE ADULT - SUBJECTIVE AND OBJECTIVE BOX
Patient is a 64y old  Male who presents with a chief complaint of fever, HA (05 Jan 2025 08:08)    HPI:  65 y/o male with pmhx of HTN, prior Anterior cervical discectomy and fusion w/ OSH Ortho 2023 presents as a transfer from [facility] for AMS and encephalitis. Patient has been lethargic x 4 days. Also developed fever with worsening confusion x 2 days.    While at [facility] patient was reportedly found to have a SDH, the imaging or arrival to [facility] was repeated and this was not seen. Per radiology this may have been artifact as the follow up scan does not show this. febrile to 102.7 and finger stick glucose was 398 upon chart review of the [facility] paper work. Additionally the patient was started on vancomycin and Zosyn prior to transfer. Upon arrival to [place], ED patient had a LP and csf studies show an elevated glucose, elevated proteins and TNC of 9 with neutrophilic predominance.    She does report that he travelled to [facility] 3 months ago but returned from there in good health and did not have any additional infections.  (04 Jan 2025 16:09)       prior hospital charts reviewed [  ]  primary team notes reviewed [  ]  other consultant notes reviewed [  ]    PAST MEDICAL & SURGICAL HISTORY:  Hypertension          Allergies  Cipro (Unknown)    ANTIMICROBIALS (past 90 days)  MEDICATIONS  (STANDING):  acyclovir IVPB   170 mL/Hr IV Intermittent (01-05-25 @ 05:19)   170 mL/Hr IV Intermittent (01-04-25 @ 20:12)    cefTRIAXone   IVPB   100 mL/Hr IV Intermittent (01-05-25 @ 05:14)   100 mL/Hr IV Intermittent (01-04-25 @ 18:41)    piperacillin/tazobactam IVPB..   25 mL/Hr IV Intermittent (01-04-25 @ 17:36)   25 mL/Hr IV Intermittent (01-04-25 @ 09:22)    vancomycin  IVPB   300 mL/Hr IV Intermittent (01-05-25 @ 05:19)   300 mL/Hr IV Intermittent (01-04-25 @ 20:01)        acyclovir IVPB 1000 every 8 hours  cefTRIAXone   IVPB 2000 every 12 hours  vancomycin  IVPB 1500 every 12 hours    MEDICATIONS  (STANDING):  atorvastatin 80 at bedtime  dextrose 50% Injectable 25 once  dextrose 50% Injectable 12.5 once  dextrose 50% Injectable 25 once  dextrose Oral Gel 15 once PRN  glucagon  Injectable 1 once  hydrALAZINE 10 two times a day  insulin glargine Injectable (LANTUS) 10 once  insulin glargine Injectable (LANTUS) 20 at bedtime  insulin lispro (ADMELOG) corrective regimen sliding scale  every 4 hours  melatonin 3 at bedtime PRN  NIFEdipine XL 60 daily    SOCIAL HISTORY:       FAMILY HISTORY:    REVIEW OF SYSTEMS  [  ] ROS unobtainable because:    [  ] All other systems negative except as noted below:	    Constitutional:  [ ] fever [ ] chills  [ ] weight loss  [ ] weakness  Skin:  [ ] rash [ ] phlebitis	  Eyes: [ ] icterus [ ] pain  [ ] discharge	  ENMT: [ ] sore throat  [ ] thrush [ ] ulcers [ ] exudates  Respiratory: [ ] dyspnea [ ] hemoptysis [ ] cough [ ] sputum	  Cardiovascular:  [ ] chest pain [ ] palpitations [ ] edema	  Gastrointestinal:  [ ] nausea [ ] vomiting [ ] diarrhea [ ] constipation [ ] pain	  Genitourinary:  [ ] dysuria [ ] frequency [ ] hematuria [ ] discharge [ ] flank pain  [ ] incontinence  Musculoskeletal:  [ ] myalgias [ ] arthralgias [ ] arthritis  [ ] back pain  Neurological:  [ ] headache [ ] seizures  [ ] confusion/altered mental status  Psychiatric:  [ ] anxiety [ ] depression	  Hematology/Lymphatics:  [ ] lymphadenopathy  Endocrine:  [ ] adrenal [ ] thyroid  Allergic/Immunologic:	 [ ] transplant [ ] seasonal    Vital Signs Last 24 Hrs  T(F): 97.6 (01-05-25 @ 08:28), Max: 100.9 (01-03-25 @ 22:31)  Vital Signs Last 24 Hrs  HR: 70 (01-05-25 @ 09:20) (62 - 81)  BP: 150/90 (01-05-25 @ 09:20) (134/91 - 188/96)  RR: 18 (01-05-25 @ 08:28)  SpO2: 89% (01-05-25 @ 09:20) (89% - 98%)  Wt(kg): --    PHYSICAL EXAM:  Constitutional: non-toxic, no distress  HEAD/EYES: anicteric, no conjunctival injection  ENT:  supple, no thrush  Cardiovascular:   normal S1, S2, no murmur, no edema  Respiratory:  clear BS bilaterally, no wheezes, no rales  GI:  soft, non-tender, normal bowel sounds  :  no juan, no CVA tenderness  Musculoskeletal:  no synovitis, normal ROM  Neurologic: awake and alert, normal strength, no focal findings  Skin:  no rash, no erythema, no phlebitis  Heme/Onc: no lymphadenopathy   Psychiatric:  awake, alert, appropriate mood                            14.5   7.99  )-----------( 249      ( 05 Jan 2025 06:29 )             42.6   01-05    136  |  100  |  12  ----------------------------<  260[H]  3.4[L]   |  22  |  0.83    Ca    8.9      05 Jan 2025 06:27  Phos  2.6     01-05  Mg     2.0     01-05    TPro  7.2  /  Alb  3.6  /  TBili  0.5  /  DBili  x   /  AST  18  /  ALT  20  /  AlkPhos  81  01-05    Urinalysis Basic - ( 05 Jan 2025 06:27 )    Color: x / Appearance: x / SG: x / pH: x  Gluc: 260 mg/dL / Ketone: x  / Bili: x / Urobili: x   Blood: x / Protein: x / Nitrite: x   Leuk Esterase: x / RBC: x / WBC x   Sq Epi: x / Non Sq Epi: x / Bacteria: x    MICROBIOLOGY:  Culture - Acid Fast - CSF (collected 03 Jan 2025 23:29)  Source: .CSF CSF    Culture - CSF with Gram Stain (collected 03 Jan 2025 23:29)  Source: .CSF CSF  Gram Stain (04 Jan 2025 03:31):    polymorphonuclear leukocytes seen    No organisms seen    by cytocentrifuge  Preliminary Report (04 Jan 2025 18:22):    No growth to date    Culture - Fungal, CSF (collected 03 Jan 2025 23:29)  Source: .CSF CSF  Preliminary Report (04 Jan 2025 08:57):    Testing in progress    Culture - Blood (collected 03 Jan 2025 22:50)  Source: .Blood BLOOD  Preliminary Report (05 Jan 2025 04:01):    No growth at 24 hours    Culture - Blood (collected 03 Jan 2025 22:45)  Source: .Blood BLOOD  Preliminary Report (05 Jan 2025 04:01):    No growth at 24 hours                  RADIOLOGY:  imaging below personally reviewed and agree with findings Patient is a 64y old  Male who presents with a chief complaint of fever, HA (05 Jan 2025 08:08)    HPI:  65 y/o male with pmhx of HTN, prior Anterior cervical discectomy and fusion w/ OSH Ortho 2023 presents as a transfer from [facility] for AMS and encephalitis. Patient has been lethargic x 4 days with worsening confusion x 2 days. Pt states that for the past 2 week he has been having daily fever, denies any other symptoms. While at [facility] patient was reportedly found to have a SDH, the imaging or arrival to [facility] was repeated and this was not seen. Per radiology this may have been artifact as the follow up scan does not show this. febrile to 102.7 and finger stick glucose was 398 upon chart review of the [facility] paper work. Additionally the patient was started on vancomycin and Zosyn prior to transfer. Upon arrival to [place], ED patient had a LP and csf studies show an elevated glucose, elevated proteins and TNC of 9 with neutrophilic predominance.    Mental status not improved but patient still mildly confused.     PAST MEDICAL & SURGICAL HISTORY:  Hypertension          Allergies  Cipro (Unknown)    ANTIMICROBIALS (past 90 days)  MEDICATIONS  (STANDING):  acyclovir IVPB   170 mL/Hr IV Intermittent (01-05-25 @ 05:19)   170 mL/Hr IV Intermittent (01-04-25 @ 20:12)    cefTRIAXone   IVPB   100 mL/Hr IV Intermittent (01-05-25 @ 05:14)   100 mL/Hr IV Intermittent (01-04-25 @ 18:41)    piperacillin/tazobactam IVPB..   25 mL/Hr IV Intermittent (01-04-25 @ 17:36)   25 mL/Hr IV Intermittent (01-04-25 @ 09:22)    vancomycin  IVPB   300 mL/Hr IV Intermittent (01-05-25 @ 05:19)   300 mL/Hr IV Intermittent (01-04-25 @ 20:01)        acyclovir IVPB 1000 every 8 hours  cefTRIAXone   IVPB 2000 every 12 hours  vancomycin  IVPB 1500 every 12 hours    MEDICATIONS  (STANDING):  atorvastatin 80 at bedtime  dextrose 50% Injectable 25 once  dextrose 50% Injectable 12.5 once  dextrose 50% Injectable 25 once  dextrose Oral Gel 15 once PRN  glucagon  Injectable 1 once  hydrALAZINE 10 two times a day  insulin glargine Injectable (LANTUS) 10 once  insulin glargine Injectable (LANTUS) 20 at bedtime  insulin lispro (ADMELOG) corrective regimen sliding scale  every 4 hours  melatonin 3 at bedtime PRN  NIFEdipine XL 60 daily    SOCIAL HISTORY: Denies any smoking. Last travel was to [place] 3 months ago. No sick contacts     FAMILY HISTORY: No pertinent family hx     REVIEW OF SYSTEMS:    CONSTITUTIONAL: No weakness. + fever and chills  EYES:  No visual changes, no eye pain   ENT: No vertigo or throat pain   NECK: No pain or stiffness  RESPIRATORY: No cough, wheezing, hemoptysis; No shortness of breath  CARDIOVASCULAR: No chest pain or palpitations  GASTROINTESTINAL: No abdominal or epigastric pain. No nausea, vomiting, or hematemesis; No diarrhea or constipation. No melena or hematochezia.  GENITOURINARY: No dysuria, frequency or hematuria  NEUROLOGICAL: No numbness or weakness  SKIN: No itching, rashes  Psych: no anxiety or depression     Vital Signs Last 24 Hrs  T(F): 97.6 (01-05-25 @ 08:28), Max: 100.9 (01-03-25 @ 22:31)  Vital Signs Last 24 Hrs  HR: 70 (01-05-25 @ 09:20) (62 - 81)  BP: 150/90 (01-05-25 @ 09:20) (134/91 - 188/96)  RR: 18 (01-05-25 @ 08:28)  SpO2: 89% (01-05-25 @ 09:20) (89% - 98%)  Wt(kg): --    PHYSICAL EXAM:  Constitutional: non-toxic, no distress  HEAD/EYES: anicteric, no conjunctival injection  ENT: + white lesion at the back of the throat   Cardiovascular:   normal S1, S2, no murmur, no edema  Respiratory:  clear BS bilaterally, no wheezes, no rales  GI:  soft, non-tender, normal bowel sounds  :  no juan, no CVA tenderness  Neurologic: awake and alert, mildly confused. No neck stiffness   Skin:  no rash, no erythema, no phlebitis                              14.5   7.99  )-----------( 249      ( 05 Jan 2025 06:29 )             42.6   01-05    136  |  100  |  12  ----------------------------<  260[H]  3.4[L]   |  22  |  0.83    Ca    8.9      05 Jan 2025 06:27  Phos  2.6     01-05  Mg     2.0     01-05    TPro  7.2  /  Alb  3.6  /  TBili  0.5  /  DBili  x   /  AST  18  /  ALT  20  /  AlkPhos  81  01-05    Urinalysis Basic - ( 05 Jan 2025 06:27 )    Color: x / Appearance: x / SG: x / pH: x  Gluc: 260 mg/dL / Ketone: x  / Bili: x / Urobili: x   Blood: x / Protein: x / Nitrite: x   Leuk Esterase: x / RBC: x / WBC x   Sq Epi: x / Non Sq Epi: x / Bacteria: x    MICROBIOLOGY:  Culture - Acid Fast - CSF (collected 03 Jan 2025 23:29)  Source: .CSF CSF    Culture - CSF with Gram Stain (collected 03 Jan 2025 23:29)  Source: .CSF CSF  Gram Stain (04 Jan 2025 03:31):    polymorphonuclear leukocytes seen    No organisms seen    by cytocentrifuge  Preliminary Report (04 Jan 2025 18:22):    No growth to date    Culture - Fungal, CSF (collected 03 Jan 2025 23:29)  Source: .CSF CSF  Preliminary Report (04 Jan 2025 08:57):    Testing in progress    Culture - Blood (collected 03 Jan 2025 22:50)  Source: .Blood BLOOD  Preliminary Report (05 Jan 2025 04:01):    No growth at 24 hours    Culture - Blood (collected 03 Jan 2025 22:45)  Source: .Blood BLOOD  Preliminary Report (05 Jan 2025 04:01):    No growth at 24 hours                  RADIOLOGY:  imaging below personally reviewed and agree with findings    < from: MR Head w/wo IV Cont (01.04.25 @ 23:56) >  IMPRESSION:    1.  Mild subarachnoid/subdural blood, with reactive inflammation in the   dura.  2.  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    < end of copied text >   Patient is a 64y old  Male who presents with a chief complaint of fever, HA (05 Jan 2025 08:08)    HPI:  63 y/o male with pmhx of HTN, prior Anterior cervical discectomy and fusion w/ OSH Ortho 2023 presents as a transfer from [facility] for AMS and encephalitis. Patient has been lethargic x 4 days with worsening confusion x 2 days. Pt states that for the past 2 week he has been having daily fever, denies any other symptoms. While at [facility] patient was reportedly found to have a SDH, the imaging or arrival to [facility] was repeated and this was not seen. Per radiology this may have been artifact as the follow up scan does not show this. febrile to 102.7 and finger stick glucose was 398 upon chart review of the [facility] paper work. Additionally the patient was started on vancomycin and Zosyn prior to transfer. Upon arrival to [place], ED patient had a LP and csf studies show an elevated glucose, elevated proteins and TNC of 9 with neutrophilic predominance.    Mental status improved but patient still mildly confused.     PAST MEDICAL & SURGICAL HISTORY:  Hypertension          Allergies  Cipro (Unknown)    ANTIMICROBIALS (past 90 days)  MEDICATIONS  (STANDING):  acyclovir IVPB   170 mL/Hr IV Intermittent (01-05-25 @ 05:19)   170 mL/Hr IV Intermittent (01-04-25 @ 20:12)    cefTRIAXone   IVPB   100 mL/Hr IV Intermittent (01-05-25 @ 05:14)   100 mL/Hr IV Intermittent (01-04-25 @ 18:41)    piperacillin/tazobactam IVPB..   25 mL/Hr IV Intermittent (01-04-25 @ 17:36)   25 mL/Hr IV Intermittent (01-04-25 @ 09:22)    vancomycin  IVPB   300 mL/Hr IV Intermittent (01-05-25 @ 05:19)   300 mL/Hr IV Intermittent (01-04-25 @ 20:01)        acyclovir IVPB 1000 every 8 hours  cefTRIAXone   IVPB 2000 every 12 hours  vancomycin  IVPB 1500 every 12 hours    MEDICATIONS  (STANDING):  atorvastatin 80 at bedtime  dextrose 50% Injectable 25 once  dextrose 50% Injectable 12.5 once  dextrose 50% Injectable 25 once  dextrose Oral Gel 15 once PRN  glucagon  Injectable 1 once  hydrALAZINE 10 two times a day  insulin glargine Injectable (LANTUS) 10 once  insulin glargine Injectable (LANTUS) 20 at bedtime  insulin lispro (ADMELOG) corrective regimen sliding scale  every 4 hours  melatonin 3 at bedtime PRN  NIFEdipine XL 60 daily        SOCIAL HISTORY:   Denies any smoking. Last travel was to [place] 3 months ago. No sick contacts         FAMILY HISTORY:   No pertinent family hx of DM         REVIEW OF SYSTEMS:    CONSTITUTIONAL: No weakness. + fever and chills  EYES:  No eye pain   ENT: No throat pain   NECK: No stiffness  RESPIRATORY: No cough, ; No shortness of breath  CARDIOVASCULAR: No chest pain  GASTROINTESTINAL: No abdominal or epigastric pain. No nausea, vomiting  GENITOURINARY: No dysuria, frequency  NEUROLOGICAL: No new focal weakness, rest per hpi  SKIN: No rashes  Psych: no anxiety or depression         Vital Signs Last 24 Hrs  T(F): 97.6 (01-05-25 @ 08:28), Max: 100.9 (01-03-25 @ 22:31)  Vital Signs Last 24 Hrs  HR: 70 (01-05-25 @ 09:20) (62 - 81)  BP: 150/90 (01-05-25 @ 09:20) (134/91 - 188/96)  RR: 18 (01-05-25 @ 08:28)  SpO2: 89% (01-05-25 @ 09:20) (89% - 98%)  Wt(kg): --      PHYSICAL EXAM:  Constitutional: non-toxic, no distress  HEAD/EYES: anicteric, no conjunctival injection  ENT: + exudates noted b/l tonsils.   Cardiovascular:   normal S1, S2,   Respiratory:  clear BS bilaterally, no rales  GI:  soft, non-tender,   :  no juan,  Neurologic: awake and alert, mildly confused. No neck stiffness   Skin:  no rash,   Extremities: no edema                               14.5   7.99  )-----------( 249      ( 05 Jan 2025 06:29 )             42.6   01-05    136  |  100  |  12  ----------------------------<  260[H]  3.4[L]   |  22  |  0.83    Ca    8.9      05 Jan 2025 06:27  Phos  2.6     01-05  Mg     2.0     01-05    TPro  7.2  /  Alb  3.6  /  TBili  0.5  /  DBili  x   /  AST  18  /  ALT  20  /  AlkPhos  81  01-05    Urinalysis Basic - ( 05 Jan 2025 06:27 )    Color: x / Appearance: x / SG: x / pH: x  Gluc: 260 mg/dL / Ketone: x  / Bili: x / Urobili: x   Blood: x / Protein: x / Nitrite: x   Leuk Esterase: x / RBC: x / WBC x   Sq Epi: x / Non Sq Epi: x / Bacteria: x    MICROBIOLOGY:  Culture - Acid Fast - CSF (collected 03 Jan 2025 23:29)  Source: .CSF CSF    Culture - CSF with Gram Stain (collected 03 Jan 2025 23:29)  Source: .CSF CSF  Gram Stain (04 Jan 2025 03:31):    polymorphonuclear leukocytes seen    No organisms seen    by cytocentrifuge  Preliminary Report (04 Jan 2025 18:22):    No growth to date    Culture - Fungal, CSF (collected 03 Jan 2025 23:29)  Source: .CSF CSF  Preliminary Report (04 Jan 2025 08:57):    Testing in progress    Culture - Blood (collected 03 Jan 2025 22:50)  Source: .Blood BLOOD  Preliminary Report (05 Jan 2025 04:01):    No growth at 24 hours    Culture - Blood (collected 03 Jan 2025 22:45)  Source: .Blood BLOOD  Preliminary Report (05 Jan 2025 04:01):    No growth at 24 hours            RADIOLOGY:  imaging below personally reviewed and agree with findings    < from: MR Head w/wo IV Cont (01.04.25 @ 23:56) >  IMPRESSION:    1.  Mild subarachnoid/subdural blood, with reactive inflammation in the   dura.  2.  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

## 2025-01-05 NOTE — CONSULT NOTE ADULT - ATTENDING COMMENTS
64M no ACAP. Hx HTN, prior ACDF w/ OSH Ortho 2023. xfer [facility] for AMS. CTH w/ artifact vs trace falcine SDH, rpt CTH wnl. Increased thirst, incr urination x1wk, finger stick 398, Na 134. Febrile 100.9. no trauma.     -no nsgy intervention  -adm Neuro vs med for AMS
Patient seen and examined - history reviewed - at least 4 days of fever and confusion - brought to ED at [place] - there had 102.7F - was given does of Vanco and Zosyn - transferred to [facility].  Labs as above - suggestive of bacterial meningitis.  Improved mentation (as of 10:30 am when I saw patient) - awake and alert - oriented to month, date, year - hospital (not name) [place]  Following commands  Remainder as above.  Follow up cultures - viral panel negative.  ID evaluation -
63 y/o male with pmhx of HTN, prior Anterior cervical discectomy and fusion w/ OSH Ortho 2023 presents as a transfer from [facility] for AMS. Patient has been lethargic x 4 days and developed fever with worsening confusion x 2 days.    Assessment:  #AMS  #Fever      [facility] WBC of 11 and finger stick glucose up to 520. S/P Vancomycin and Zosyn prior to transfer to [facility].   -+nuchal rigidity and a + brudzinski's sign on neurology's exam (per note) , not present on our exam.   -LP showing elevated glucose, elevated proteins and TNC of 9 with neutrophilic predominance. CSF Cx neg, HSV neg   -BCX NGTD, RVP neg   -MRI brain w/wo contrast with mild subarachnoid/subdural blood, with reactive inflammation in the dura. 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  -Pt travelled to [facility] 3 months ago, no issues till 4 days prior to admission  -Pt currently on Vanc, Ceftriaxone and Acyclovir  -Pt with hx of prior Anterior cervical discectomy and fusion w/ OSH Ortho 2023        Recommendation:  -Cont Vanc and Ceftriaxone for now.   -Can d/c Acyclovir  -Check MR neck and cervical spine (with contrast if possible)  -Check CT C/A/P (with contrast if possible)  -Check MRSA swab, ESR, CRP, procal, EBV PCR/serology, HIV, CMV PCR   -Pt also with exudative lesion at the back of his throat, get throat swab  -Cont to monitor fever and WBC curve       Sabrina Shelby  Please contact through MS Teams   If no response or past 5 pm/weekend call [phone].

## 2025-01-05 NOTE — CONSULT NOTE ADULT - SUBJECTIVE AND OBJECTIVE BOX
HPI: 64 yr old Male with pmhx of HTN, prior Anterior cervical discectomy and fusion w/ OSH Ortho 2023 presents as a transfer from [facility] for AMS and encephalitis. Patient has been lethargic x 4 days. Also developed fever with worsening confusion x 2 days.  While at [facility] patient was reportedly found to have a SDH, the imaging or arrival to [facility] was repeated and this was not seen. Per radiology this may have been artifact as the follow up scan does not show this. febrile to 102.7 and finger stick glucose was 398 upon chart review of the [facility] paper work. Additionally the patient was started on vancomycin and Zosyn prior to transfer. Upon arrival to [place], ED patient had a LP and csf studies show an elevated glucose, elevated proteins and TNC of 9 with neutrophilic predominance.  Patient found to have A1c 11.8%. Overnight, patient had extreme BG elevation to 600 with elevated BHB 3.5, venous pH 7.35, elevated AG 23, and low bicarb of 14. He was treated with 10 units lantus and 10 units admelog with improvement in BG to 260 this morning. Labs also improved this morning with normalization of AG to 14 and bicarb of 22. Endocrinology consulted for further DM management recommendations. Patient remains NPO.    Diabetes history:  Age at Dx:  How dx:  Hx and duration of insulin:  Hx of hypoglycemia:  Hx of DKA/HHS?    Microvascular Complications:   Retinopathy?  Neuropathy?  Nephropathy?  Hx of foot ulcer/amputation?    Macrovascular Complications:  Hx of CHF?  Hx of CAD/PAD?  Hx of CVA?      FH:  DM:  Thyroid:  Autoimmune:  Other:    SH:  Smoking: denies  Etoh: denies  Recreational Drugs: denies    PAST MEDICAL & SURGICAL HISTORY:  Hypertension      Current Meds:  acyclovir IVPB 1000 milliGRAM(s) IV Intermittent every 8 hours  cefTRIAXone   IVPB 2000 milliGRAM(s) IV Intermittent every 12 hours  dextrose 5%. 1000 milliLiter(s) IV Continuous <Continuous>  dextrose 5%. 1000 milliLiter(s) IV Continuous <Continuous>  dextrose 50% Injectable 25 Gram(s) IV Push once  dextrose 50% Injectable 12.5 Gram(s) IV Push once  dextrose 50% Injectable 25 Gram(s) IV Push once  dextrose Oral Gel 15 Gram(s) Oral once PRN  enoxaparin Injectable 40 milliGRAM(s) SubCutaneous every 24 hours  glucagon  Injectable 1 milliGRAM(s) IntraMuscular once  hydrALAZINE 10 milliGRAM(s) Oral two times a day  insulin glargine Injectable (LANTUS) 10 Unit(s) SubCutaneous at bedtime  insulin lispro (ADMELOG) corrective regimen sliding scale   SubCutaneous every 4 hours  lactated ringers. 1000 milliLiter(s) IV Continuous <Continuous>  melatonin 3 milliGRAM(s) Oral at bedtime PRN  NIFEdipine XL 60 milliGRAM(s) Oral daily  vancomycin  IVPB 1500 milliGRAM(s) IV Intermittent every 12 hours      Allergies:  Cipro (Unknown)      ROS:     Constitutional: No fever, good appetite/po intake  Eyes: No blurry vision, diplopia  Neuro: No tremors  HEENT: No pain  Cardiovascular: No chest pain, palpitations  Respiratory: No SOB, no cough  GI: No nausea, vomiting,   : No dysuria, hematuria  Skin: no rash  Psych: no depression  Endocrine: no polyuria, polydipsia  Hem/lymph: no swelling  Osteoporosis: no fractures     Vital Signs Last 24 Hrs  T(C): 36.6 (05 Jan 2025 05:07), Max: 37 (04 Jan 2025 11:28)  T(F): 97.8 (05 Jan 2025 05:07), Max: 98.6 (04 Jan 2025 11:28)  HR: 62 (05 Jan 2025 05:07) (62 - 81)  BP: 134/91 (05 Jan 2025 05:07) (134/91 - 188/96)  BP(mean): 116 (04 Jan 2025 15:26) (114 - 120)  RR: 18 (05 Jan 2025 05:07) (16 - 18)  SpO2: 96% (05 Jan 2025 05:07) (96% - 98%)    Parameters below as of 05 Jan 2025 05:07  Patient On (Oxygen Delivery Method): room air      Height (cm): 160 (01-04 @ 23:56)  Weight (kg): 99.8 (01-03 @ 22:31)  BMI (kg/m2): 39 (01-04 @ 23:56)    VITALS: T(C): 36.6 (01-05-25 @ 05:07)  T(F): 97.8 (01-05-25 @ 05:07), Max: 98.6 (01-04-25 @ 11:28)  HR: 62 (01-05-25 @ 05:07) (62 - 81)  BP: 134/91 (01-05-25 @ 05:07) (134/91 - 188/96)  RR:  (16 - 18)  SpO2:  (96% - 98%)  Wt(kg): --  GENERAL: NAD, well-groomed, well-developed  EYES: No proptosis, no lid lag, anicteric, extraocular movements intact  HEENT:  Atraumatic, Normocephalic, moist mucous membranes  THYROID: Normal size, no palpable nodules, no thyromegaly  RESPIRATORY: non labored breathing, no accessory muscle use  CARDIOVASCULAR: non tachycardic, no peripheral edema  GI: Soft, nontender, non distended   SKIN: Dry, intact, No rashes or lesions  NEURO: sensation intact, no tremor  EXTREMITIES: no foot ulcers and bilateral distal pedal pulses intact  PSYCH: reactive affect, euthymic mood      LABS:                        14.5   7.99  )-----------( 249      ( 05 Jan 2025 06:29 )             42.6     01-05    136  |  100  |  12  ----------------------------<  260[H]  3.4[L]   |  22  |  0.83    Ca    8.9      05 Jan 2025 06:27  Phos  2.6     01-05  Mg     2.0     01-05    TPro  7.2  /  Alb  3.6  /  TBili  0.5  /  DBili  x   /  AST  18  /  ALT  20  /  AlkPhos  81  01-05    PT/INR - ( 03 Jan 2025 22:59 )   PT: 12.6 sec;   INR: 1.10 ratio         PTT - ( 03 Jan 2025 22:59 )  PTT:25.3 sec  Urinalysis Basic - ( 05 Jan 2025 06:27 )    Color: x / Appearance: x / SG: x / pH: x  Gluc: 260 mg/dL / Ketone: x  / Bili: x / Urobili: x   Blood: x / Protein: x / Nitrite: x   Leuk Esterase: x / RBC: x / WBC x   Sq Epi: x / Non Sq Epi: x / Bacteria: x      RADIOLOGY & ADDITIONAL STUDIES:  CAPILLARY BLOOD GLUCOSE      POCT Blood Glucose.: 300 mg/dL (05 Jan 2025 07:35)  POCT Blood Glucose.: 235 mg/dL (05 Jan 2025 06:10)  POCT Blood Glucose.: 367 mg/dL (05 Jan 2025 03:02)  POCT Blood Glucose.: 490 mg/dL (04 Jan 2025 21:45)  POCT Blood Glucose.: 504 mg/dL (04 Jan 2025 21:31)  POCT Blood Glucose.: 520 mg/dL (04 Jan 2025 21:28)        A/P: 64y Male with pmhx of HTN, prior Anterior cervical discectomy and fusion w/ OSH Ortho 2023 presents as a transfer from [facility] for AMS and encephalitis. Overnight, patient had extreme BG elevation to 600 and labs consistent with DKA, he was treated with IVF and subcutaneous insulin with resolution of ketoacidosis. Patient also found to have A1c 11.8%, Endocrine team consulted for uncontrolled diabetes. Patient is high risk with high level decision making due to uncontrolled diabetes which places patient at high risk for cardiovascular and cerebrovascular events. Patient with lability of glucose requiring close monitoring and insulin adjustments.    # T2DM with hyperglycemia   # DKA- now resolved s/p IVF and subcutaneous insulin  - Most recent Hemoglobin A1C 11.8%  - Current FS ranges from 270-600 mg/dL  - Current diet: NPO  - Please monitor blood glucose values TID AC & QHS while eating regular meals and Q6H while NPO  - Blood glucose goals pre-meal less than 140 mg/dL and random blood glucose less than 180 mg/dL  - Recommendations:  - Recommend giving stat insulin glargine 10 units now  - Increase scheduled glargine to 20 units QHS starting tonight (1/5/25)  - Moderate correction scale lispro q6h  - Please inform endocrinology if change in diet planned or plan to start steroids as insulin requirements will be affected    Discharge planning: ***  - Home DM medications:   - Discharge DM medications:   - Patient will follow up at  [facility]:  [address] [place] [address]. [address]. [place] [address]  Tel: [phone]    [facility]  [address] [facility] [place], [address]    [facility]  [address], [place] [id number] [phone]    PATIENT ACCESS SERVICES  [phone]  For all [facility] Endocrine practices phone numbers and locations throughout [facility], and [place].    ***  - Insulin teaching  - Please ensure patient has the following diabetes supplies:  - Glucometer (ACCU_CHECK jeana Connect, Ascensia Contour Next EZ or One, Freestyle Freedome LITE or OneTouch Verio IQ)  - Glucometer test strips and lancets  (make sure compatible w/ glucometer), Dispense #100 (or #200) use as directed  - Lantus Solostar Pen (or Basaglar Kwikpen) ____ units before bedtime (dose TBD)  - BD mitchell 4mm pen needles.   - Please verify with pharmacy that each script is covered before discharge.  - Patient will need opthalmology and podiatry follow up as outpatient     # HTN  - BP goal 130/80  - On hydralazine 10 mg BID and nifedipine XL 60 mg daily  - Manage per primary team     # HLD  - Check fasting lipid panel, manage as outpatient  - Goal LDL<70      Thank you for the consult. Will continue to monitor. Please inform the endocrinology team at least 24 hours prior to intended discharge date.     Contact via pager or [employer] Teams during business hours. For follow up questions, discharge recommendations, or new consults please call answering service at [phone] (weekdays), [phone] (nights/weekends). For nonurgent matters, please email  [email or web address].      Veronica Sánchez D.O.  Attending Physician   Department of Endocrinology, Diabetes and Metabolism   Phase III Development Teams     For urgent matters before 9AM or after 5PM, or on weekends/holidays, please page the on call endocrine fellow.   For nonurgent matters, please email [email or web address] for assistance. HPI: 64 yr old Male with pmhx of HTN, prior Anterior cervical discectomy and fusion w/ OSH Ortho 2023 presents as a transfer from [facility] for AMS and encephalitis. Patient has been lethargic x 4 days. Also developed fever with worsening confusion x 2 days.  While at [facility] patient was reportedly found to have a SDH, the imaging or arrival to [facility] was repeated and this was not seen. Per radiology this may have been artifact as the follow up scan does not show this. febrile to 102.7 and finger stick glucose was 398 upon chart review of the [facility] paper work. Additionally the patient was started on vancomycin and Zosyn prior to transfer. Upon arrival to [place], ED patient had a LP and csf studies show an elevated glucose, elevated proteins and TNC of 9 with neutrophilic predominance.  Patient found to have A1c 11.8%. Overnight, patient had extreme BG elevation to 600 with elevated BHB 3.5, venous pH 7.35, elevated AG 23, and low bicarb of 14. He was treated with 10 units lantus and 10 units admelog with improvement in BG to 260 this morning. Labs also improved this morning with normalization of AG to 14 and bicarb of 22. Endocrinology consulted for further DM management recommendations.     Patient denies previous hx of DM or prediabetes. He reports he saw his PCP last week and was told his recent bloodwork was normal. He denies fhx of DM.  He admits to polyphagia, polydipsia, polyuria ongoing for the past 2 weeks. He notes he had been drinking a lot of coconut water, apple cider vinegar with cinnamon, and juice. He also has lost weight ~11 lbs over the past several weeks unintentionally. He denies hx of retinopathy, however admits to blurred vision- feels his glasses are "too strong." He denies neuropathy or nephropathy or hx of foot ulcer.    FH: No fhx of DM    SH:  denies drug use    PAST MEDICAL & SURGICAL HISTORY:  Hypertension      Current Meds:  acyclovir IVPB 1000 milliGRAM(s) IV Intermittent every 8 hours  cefTRIAXone   IVPB 2000 milliGRAM(s) IV Intermittent every 12 hours  dextrose 5%. 1000 milliLiter(s) IV Continuous <Continuous>  dextrose 5%. 1000 milliLiter(s) IV Continuous <Continuous>  dextrose 50% Injectable 25 Gram(s) IV Push once  dextrose 50% Injectable 12.5 Gram(s) IV Push once  dextrose 50% Injectable 25 Gram(s) IV Push once  dextrose Oral Gel 15 Gram(s) Oral once PRN  enoxaparin Injectable 40 milliGRAM(s) SubCutaneous every 24 hours  glucagon  Injectable 1 milliGRAM(s) IntraMuscular once  hydrALAZINE 10 milliGRAM(s) Oral two times a day  insulin glargine Injectable (LANTUS) 10 Unit(s) SubCutaneous at bedtime  insulin lispro (ADMELOG) corrective regimen sliding scale   SubCutaneous every 4 hours  lactated ringers. 1000 milliLiter(s) IV Continuous <Continuous>  melatonin 3 milliGRAM(s) Oral at bedtime PRN  NIFEdipine XL 60 milliGRAM(s) Oral daily  vancomycin  IVPB 1500 milliGRAM(s) IV Intermittent every 12 hours      Allergies:  Cipro (Unknown)      ROS:     Constitutional: +fever  Eyes: +blurred vision  Neuro: No tremors  HEENT: No pain  Cardiovascular: No chest pain, palpitations  Respiratory: No SOB, no cough  GI: No nausea, vomiting,   : No dysuria, hematuria  Skin: no rash  Psych: no depression  Endocrine: +hyperglycemia, +polyuria, +polydipsia, +polyphagia  Hem/lymph: no swelling  Osteoporosis: no fractures     Vital Signs Last 24 Hrs  T(C): 36.6 (05 Jan 2025 05:07), Max: 37 (04 Jan 2025 11:28)  T(F): 97.8 (05 Jan 2025 05:07), Max: 98.6 (04 Jan 2025 11:28)  HR: 62 (05 Jan 2025 05:07) (62 - 81)  BP: 134/91 (05 Jan 2025 05:07) (134/91 - 188/96)  BP(mean): 116 (04 Jan 2025 15:26) (114 - 120)  RR: 18 (05 Jan 2025 05:07) (16 - 18)  SpO2: 96% (05 Jan 2025 05:07) (96% - 98%)    Parameters below as of 05 Jan 2025 05:07  Patient On (Oxygen Delivery Method): room air      Height (cm): 160 (01-04 @ 23:56)  Weight (kg): 99.8 (01-03 @ 22:31)  BMI (kg/m2): 39 (01-04 @ 23:56)    VITALS: T(C): 36.6 (01-05-25 @ 05:07)  T(F): 97.8 (01-05-25 @ 05:07), Max: 98.6 (01-04-25 @ 11:28)  HR: 62 (01-05-25 @ 05:07) (62 - 81)  BP: 134/91 (01-05-25 @ 05:07) (134/91 - 188/96)  RR:  (16 - 18)  SpO2:  (96% - 98%)  Wt(kg): --  GENERAL: NAD, well-groomed, well-developed  EYES: No proptosis, no lid lag, anicteric, extraocular movements intact  HEENT:  Atraumatic, Normocephalic, moist mucous membranes  THYROID: Normal size, no palpable nodules, no thyromegaly  RESPIRATORY: non labored breathing, no accessory muscle use  CARDIOVASCULAR: non tachycardic, no peripheral edema  GI: Soft, nontender, non distended   SKIN: Dry, intact, No rashes or lesions  NEURO: sensation intact, no tremor  EXTREMITIES: no foot ulcers and bilateral distal pedal pulses intact  PSYCH: reactive affect, euthymic mood      LABS:                        14.5   7.99  )-----------( 249      ( 05 Jan 2025 06:29 )             42.6     01-05    136  |  100  |  12  ----------------------------<  260[H]  3.4[L]   |  22  |  0.83    Ca    8.9      05 Jan 2025 06:27  Phos  2.6     01-05  Mg     2.0     01-05    TPro  7.2  /  Alb  3.6  /  TBili  0.5  /  DBili  x   /  AST  18  /  ALT  20  /  AlkPhos  81  01-05    PT/INR - ( 03 Jan 2025 22:59 )   PT: 12.6 sec;   INR: 1.10 ratio         PTT - ( 03 Jan 2025 22:59 )  PTT:25.3 sec  Urinalysis Basic - ( 05 Jan 2025 06:27 )    Color: x / Appearance: x / SG: x / pH: x  Gluc: 260 mg/dL / Ketone: x  / Bili: x / Urobili: x   Blood: x / Protein: x / Nitrite: x   Leuk Esterase: x / RBC: x / WBC x   Sq Epi: x / Non Sq Epi: x / Bacteria: x      RADIOLOGY & ADDITIONAL STUDIES:  CAPILLARY BLOOD GLUCOSE      POCT Blood Glucose.: 300 mg/dL (05 Jan 2025 07:35)  POCT Blood Glucose.: 235 mg/dL (05 Jan 2025 06:10)  POCT Blood Glucose.: 367 mg/dL (05 Jan 2025 03:02)  POCT Blood Glucose.: 490 mg/dL (04 Jan 2025 21:45)  POCT Blood Glucose.: 504 mg/dL (04 Jan 2025 21:31)  POCT Blood Glucose.: 520 mg/dL (04 Jan 2025 21:28)        A/P: 64y Male with pmhx of HTN, prior Anterior cervical discectomy and fusion w/ OSH Ortho 2023 presents as a transfer from [facility] for AMS and encephalitis. Overnight, patient had extreme BG elevation to 600 and labs consistent with DKA, he was treated with IVF and subcutaneous insulin with resolution of ketoacidosis. Patient also found to have A1c 11.8%, Endocrine team consulted for uncontrolled diabetes. Patient is high risk with high level decision making due to uncontrolled diabetes which places patient at high risk for cardiovascular and cerebrovascular events. Patient with lability of glucose requiring close monitoring and insulin adjustments.    # T2DM with hyperglycemia   # DKA- now resolved s/p IVF and subcutaneous insulin  - Most recent Hemoglobin A1C 11.8%  - Current FS ranges from 270-600 mg/dL  - Current diet: carb consistent  - Please monitor blood glucose values TID AC & QHS while eating regular meals and Q6H while NPO  - Blood glucose goals pre-meal less than 140 mg/dL and random blood glucose less than 180 mg/dL  - Recommendations:  - Recommend giving stat insulin glargine 10 units now  - Increase scheduled glargine to 20 units QHS starting tonight (1/5/25)  - start lispro 5 units scheduled TID at mealtimes  - recommend low correction scale lispro at mealtimes  - recommend separate low correction scale lispro at bedtime    Discharge planning:   - Discharge DM medications: can consider regimen of GLP1RA weekly (if insurance covered), Daily basal (dose TBD closer to discharge), and metformin 500 mg BID  - Send SumZero g7 sensors to pharmacy (check insurance coverage)  - Physician to RN -> Start Insulin Lantus Pen teaching  - Please ensure patient has the following diabetes supplies:  - Glucometer (ACCU_CHECK jeana Connect, Ascensia Contour Next EZ or One, Freestyle Freedome LITE or OneTouch Verio IQ)  - Glucometer test strips and lancets  (make sure compatible w/ glucometer), Dispense #100 (or #200) use as directed  - Lantus Solostar Pen (or Basaglar Kwikpen) ____ units before bedtime (dose TBD)  - BD mitchell 4mm pen needles.   - Please verify with pharmacy that each script is covered before discharge.  - Patient will need opthalmology and podiatry follow up as outpatient   Please provide patient with below information for endocrinology follow up:  PATIENT ACCESS SERVICES  [phone]  For all [facility] Endocrine practices phone numbers and locations throughout [facility], and [place].      # HTN  - BP goal 130/80  - On hydralazine 10 mg BID and nifedipine XL 60 mg daily  - Manage per primary team     # HLD  - Check fasting lipid panel, manage as outpatient  - Goal LDL<70      Thank you for the consult. Will continue to monitor. Please inform the endocrinology team at least 24 hours prior to intended discharge date.     Contact via pager or [employer] Teams during business hours. For follow up questions, discharge recommendations, or new consults please call answering service at [phone] (weekdays), [phone] (nights/weekends). For nonurgent matters, please email  [email or web address].      Veronica Sánchez D.O.  Attending Physician   Department of Endocrinology, Diabetes and Metabolism   [employer] Teams     For urgent matters before 9AM or after 5PM, or on weekends/holidays, please page the on call endocrine fellow.   For nonurgent matters, please email [email or web address] for assistance.

## 2025-01-05 NOTE — PHYSICAL THERAPY INITIAL EVALUATION ADULT - PERTINENT HX OF CURRENT PROBLEM, REHAB EVAL
Pt is a 63 y/o male admitted to [facility] admitted to [facility] on 1/4/25 with pmhx of HTN, prior Anterior cervical discectomy and fusion w/ OSH Ortho 2023 presents as a transfer from [facility] for AMS and encephalitis. Pt has been lethargic x 4 days. Also developed fever with worsening confusion x 2 days. While at [facility] pt was reportedly found to have a SDH, the imaging or arrival to [facility] was repeated and this was not seen. Per radiology this may have been artifact as the follow up scan does not show this. febrile to 102.7 and finger stick glucose was 398 upon chart review of the [facility] paper work. Additionally the pt was started on vancomycin and Zosyn prior to transfer. Hospital course: Upon arrival to [facility], ED pt had a LP and csf studies show an elevated glucose, elevated proteins and TNC of 9 with neutrophilic predominance. He does report that he travelled to [facility] 3 months ago but returned from there in good health and did not have any additional infections. MRI head: Mild subarachnoid/subdural blood, with reactive inflammation in the dura. 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 CTH: Previously seen thin parafalcine subdural hemorrhage measuring up to 2 mm is not visualized in this CT, and favored to reflect artifact on the prior study. No clear evidence of acute intracranial hemorrhage. No significant mass effect or midline shift. CTA Neck: No evidence of significant stenosis or occlusion. CTA Head: No proximal large vessel occlusion. Luminal irregularity of the bilateral vertebral artery V4 segments with several short segment of at least moderate focal luminal narrowing, which may be secondary to atherosclerotic disease versus vasculopathy/vasculitis. On neuro exam, found to have nuchal rigidity and a + brudzinski's sign.  No focal neurologic deficits noted. LP was performed and csf studies show an elevated glucose, elevated proteins and TNC of 9 with neutrophilic predominance. ID consulted. Blood culture NGTD, pleural culture gram stain NGTD.

## 2025-01-05 NOTE — PROGRESS NOTE ADULT - SUBJECTIVE AND OBJECTIVE BOX
PROGRESS NOTE:   Authored by Dr. Kathleen Zimmerman MD (PGY-1).  via TEAMS    Patient is a 64y old  Male who presents with a chief complaint of fever, HA (04 Jan 2025 16:09)      SUBJECTIVE / OVERNIGHT EVENTS:  No acute events overnight.     ADDITIONAL REVIEW OF SYSTEMS:  Patient denies fevers, chills, chest pain, shortness of breath, nausea, abdominal pain, diarrhea, constipation, dysuria, leg swelling, headache, light headedness.    MEDICATIONS  (STANDING):  acyclovir IVPB 1000 milliGRAM(s) IV Intermittent every 8 hours  cefTRIAXone   IVPB 2000 milliGRAM(s) IV Intermittent every 12 hours  dextrose 5%. 1000 milliLiter(s) (50 mL/Hr) IV Continuous <Continuous>  dextrose 5%. 1000 milliLiter(s) (100 mL/Hr) IV Continuous <Continuous>  dextrose 50% Injectable 25 Gram(s) IV Push once  dextrose 50% Injectable 12.5 Gram(s) IV Push once  dextrose 50% Injectable 25 Gram(s) IV Push once  enoxaparin Injectable 40 milliGRAM(s) SubCutaneous every 24 hours  glucagon  Injectable 1 milliGRAM(s) IntraMuscular once  hydrALAZINE 10 milliGRAM(s) Oral two times a day  insulin glargine Injectable (LANTUS) 10 Unit(s) SubCutaneous at bedtime  insulin lispro (ADMELOG) corrective regimen sliding scale   SubCutaneous every 4 hours  lactated ringers. 1000 milliLiter(s) (100 mL/Hr) IV Continuous <Continuous>  NIFEdipine XL 60 milliGRAM(s) Oral daily  potassium chloride    Tablet ER 40 milliEquivalent(s) Oral once  vancomycin  IVPB 1500 milliGRAM(s) IV Intermittent every 12 hours    MEDICATIONS  (PRN):  dextrose Oral Gel 15 Gram(s) Oral once PRN Blood Glucose LESS THAN 70 milliGRAM(s)/deciliter  melatonin 3 milliGRAM(s) Oral at bedtime PRN Insomnia      CAPILLARY BLOOD GLUCOSE      POCT Blood Glucose.: 300 mg/dL (05 Jan 2025 07:35)  POCT Blood Glucose.: 235 mg/dL (05 Jan 2025 06:10)  POCT Blood Glucose.: 367 mg/dL (05 Jan 2025 03:02)  POCT Blood Glucose.: 490 mg/dL (04 Jan 2025 21:45)  POCT Blood Glucose.: 504 mg/dL (04 Jan 2025 21:31)  POCT Blood Glucose.: 520 mg/dL (04 Jan 2025 21:28)    I&O's Summary    04 Jan 2025 07:01  -  05 Jan 2025 07:00  --------------------------------------------------------  IN: 1100 mL / OUT: 0 mL / NET: 1100 mL    05 Jan 2025 07:01  -  05 Jan 2025 08:09  --------------------------------------------------------  IN: 200 mL / OUT: 600 mL / NET: -400 mL        PHYSICAL EXAM:  Vital Signs Last 24 Hrs  T(C): 36.6 (05 Jan 2025 05:07), Max: 37 (04 Jan 2025 11:28)  T(F): 97.8 (05 Jan 2025 05:07), Max: 98.6 (04 Jan 2025 11:28)  HR: 62 (05 Jan 2025 05:07) (62 - 81)  BP: 134/91 (05 Jan 2025 05:07) (134/91 - 188/96)  BP(mean): 116 (04 Jan 2025 15:26) (114 - 120)  RR: 18 (05 Jan 2025 05:07) (16 - 18)  SpO2: 96% (05 Jan 2025 05:07) (96% - 98%)    Parameters below as of 05 Jan 2025 05:07  Patient On (Oxygen Delivery Method): room air        CONSTITUTIONAL: NAD  HEENT: Atraumatic, Normocephalic, EOMI, PERRL, Conjunctiva and sclera clear, moist mucous membranes  Neck: Supple, no elevated JVP.  RESPIRATORY: Normal respiratory effort; lungs are clear to auscultation bilaterally  CARDIOVASCULAR: Regular rate and rhythm, normal S1 and S2, no murmur/rub/gallop  ABDOMEN: Nontender to palpation, normoactive bowel sounds, no rebound/guarding; No hepatosplenomegaly  MSK: no clubbing or cyanosis of digits; no joint swelling or tenderness to palpation; no lower extremity edema; Peripheral pulses are 2+ bilaterally  NEURO: AOx3, no focal deficits  SKIN: No rashes or lesions    LABS:                        14.5   7.99  )-----------( 249      ( 05 Jan 2025 06:29 )             42.6     01-05    136  |  100  |  12  ----------------------------<  260[H]  3.4[L]   |  22  |  0.83    Ca    8.9      05 Jan 2025 06:27  Phos  2.6     01-05  Mg     2.0     01-05    TPro  7.2  /  Alb  3.6  /  TBili  0.5  /  DBili  x   /  AST  18  /  ALT  20  /  AlkPhos  81  01-05    PT/INR - ( 03 Jan 2025 22:59 )   PT: 12.6 sec;   INR: 1.10 ratio         PTT - ( 03 Jan 2025 22:59 )  PTT:25.3 sec      Urinalysis Basic - ( 05 Jan 2025 06:27 )    Color: x / Appearance: x / SG: x / pH: x  Gluc: 260 mg/dL / Ketone: x  / Bili: x / Urobili: x   Blood: x / Protein: x / Nitrite: x   Leuk Esterase: x / RBC: x / WBC x   Sq Epi: x / Non Sq Epi: x / Bacteria: x        Culture - Acid Fast - CSF (collected 03 Jan 2025 23:29)  Source: .CSF CSF    Culture - CSF with Gram Stain (collected 03 Jan 2025 23:29)  Source: .CSF CSF  Gram Stain (04 Jan 2025 03:31):    polymorphonuclear leukocytes seen    No organisms seen    by cytocentrifuge  Preliminary Report (04 Jan 2025 18:22):    No growth to date    Culture - Fungal, CSF (collected 03 Jan 2025 23:29)  Source: .CSF CSF  Preliminary Report (04 Jan 2025 08:57):    Testing in progress    Culture - Blood (collected 03 Jan 2025 22:50)  Source: .Blood BLOOD  Preliminary Report (05 Jan 2025 04:01):    No growth at 24 hours    Culture - Blood (collected 03 Jan 2025 22:45)  Source: .Blood BLOOD  Preliminary Report (05 Jan 2025 04:01):    No growth at 24 hours        Tele Reviewed:    RADIOLOGY & ADDITIONAL TESTS:  Results Reviewed:   Imaging Personally Reviewed:  Electrocardiogram Personally Reviewed:     PROGRESS NOTE:   Authored by Dr. Kathleen Zimmerman MD (PGY-1).  via TEAMS    Patient is a 64y old  Male who presents with a chief complaint of fever, HA (04 Jan 2025 16:09)      SUBJECTIVE / OVERNIGHT EVENTS:  Glucose over 500 overnight. Received insulin with improvement of AG, gluc, and BHB. Overall feels better.    ADDITIONAL REVIEW OF SYSTEMS:  Patient denies fevers, chills, chest pain, shortness of breath, nausea, abdominal pain, diarrhea, constipation, dysuria, leg swelling, headache, light headedness.    MEDICATIONS  (STANDING):  acyclovir IVPB 1000 milliGRAM(s) IV Intermittent every 8 hours  cefTRIAXone   IVPB 2000 milliGRAM(s) IV Intermittent every 12 hours  dextrose 5%. 1000 milliLiter(s) (50 mL/Hr) IV Continuous <Continuous>  dextrose 5%. 1000 milliLiter(s) (100 mL/Hr) IV Continuous <Continuous>  dextrose 50% Injectable 25 Gram(s) IV Push once  dextrose 50% Injectable 12.5 Gram(s) IV Push once  dextrose 50% Injectable 25 Gram(s) IV Push once  enoxaparin Injectable 40 milliGRAM(s) SubCutaneous every 24 hours  glucagon  Injectable 1 milliGRAM(s) IntraMuscular once  hydrALAZINE 10 milliGRAM(s) Oral two times a day  insulin glargine Injectable (LANTUS) 10 Unit(s) SubCutaneous at bedtime  insulin lispro (ADMELOG) corrective regimen sliding scale   SubCutaneous every 4 hours  lactated ringers. 1000 milliLiter(s) (100 mL/Hr) IV Continuous <Continuous>  NIFEdipine XL 60 milliGRAM(s) Oral daily  potassium chloride    Tablet ER 40 milliEquivalent(s) Oral once  vancomycin  IVPB 1500 milliGRAM(s) IV Intermittent every 12 hours    MEDICATIONS  (PRN):  dextrose Oral Gel 15 Gram(s) Oral once PRN Blood Glucose LESS THAN 70 milliGRAM(s)/deciliter  melatonin 3 milliGRAM(s) Oral at bedtime PRN Insomnia      CAPILLARY BLOOD GLUCOSE      POCT Blood Glucose.: 300 mg/dL (05 Jan 2025 07:35)  POCT Blood Glucose.: 235 mg/dL (05 Jan 2025 06:10)  POCT Blood Glucose.: 367 mg/dL (05 Jan 2025 03:02)  POCT Blood Glucose.: 490 mg/dL (04 Jan 2025 21:45)  POCT Blood Glucose.: 504 mg/dL (04 Jan 2025 21:31)  POCT Blood Glucose.: 520 mg/dL (04 Jan 2025 21:28)    I&O's Summary    04 Jan 2025 07:01  -  05 Jan 2025 07:00  --------------------------------------------------------  IN: 1100 mL / OUT: 0 mL / NET: 1100 mL    05 Jan 2025 07:01  -  05 Jan 2025 08:09  --------------------------------------------------------  IN: 200 mL / OUT: 600 mL / NET: -400 mL        PHYSICAL EXAM:  Vital Signs Last 24 Hrs  T(C): 36.6 (05 Jan 2025 05:07), Max: 37 (04 Jan 2025 11:28)  T(F): 97.8 (05 Jan 2025 05:07), Max: 98.6 (04 Jan 2025 11:28)  HR: 62 (05 Jan 2025 05:07) (62 - 81)  BP: 134/91 (05 Jan 2025 05:07) (134/91 - 188/96)  BP(mean): 116 (04 Jan 2025 15:26) (114 - 120)  RR: 18 (05 Jan 2025 05:07) (16 - 18)  SpO2: 96% (05 Jan 2025 05:07) (96% - 98%)    Parameters below as of 05 Jan 2025 05:07  Patient On (Oxygen Delivery Method): room air        CONSTITUTIONAL: NAD  HEENT: Atraumatic, Normocephalic, EOMI, PERRL, Conjunctiva and sclera clear, moist mucous membranes  Neck: Supple, FROM, no elevated JVP.  RESPIRATORY: Normal respiratory effort; lungs are clear to auscultation bilaterally  CARDIOVASCULAR: Regular rate and rhythm, normal S1 and S2, no murmur/rub/gallop  ABDOMEN: Obese abdomen. Nontender to palpation, normoactive bowel sounds, no rebound/guarding; No hepatosplenomegaly  MSK: no clubbing or cyanosis of digits; no joint swelling or tenderness to palpation; no lower extremity edema; Peripheral pulses are 2+ bilaterally  NEURO: AOx3, no focal deficits, speech much improved.   SKIN: No rashes or lesions    LABS:                        14.5   7.99  )-----------( 249      ( 05 Jan 2025 06:29 )             42.6     01-05    136  |  100  |  12  ----------------------------<  260[H]  3.4[L]   |  22  |  0.83    Ca    8.9      05 Jan 2025 06:27  Phos  2.6     01-05  Mg     2.0     01-05    TPro  7.2  /  Alb  3.6  /  TBili  0.5  /  DBili  x   /  AST  18  /  ALT  20  /  AlkPhos  81  01-05    PT/INR - ( 03 Jan 2025 22:59 )   PT: 12.6 sec;   INR: 1.10 ratio         PTT - ( 03 Jan 2025 22:59 )  PTT:25.3 sec      Urinalysis Basic - ( 05 Jan 2025 06:27 )    Color: x / Appearance: x / SG: x / pH: x  Gluc: 260 mg/dL / Ketone: x  / Bili: x / Urobili: x   Blood: x / Protein: x / Nitrite: x   Leuk Esterase: x / RBC: x / WBC x   Sq Epi: x / Non Sq Epi: x / Bacteria: x        Culture - Acid Fast - CSF (collected 03 Jan 2025 23:29)  Source: .CSF CSF    Culture - CSF with Gram Stain (collected 03 Jan 2025 23:29)  Source: .CSF CSF  Gram Stain (04 Jan 2025 03:31):    polymorphonuclear leukocytes seen    No organisms seen    by cytocentrifuge  Preliminary Report (04 Jan 2025 18:22):    No growth to date    Culture - Fungal, CSF (collected 03 Jan 2025 23:29)  Source: .CSF CSF  Preliminary Report (04 Jan 2025 08:57):    Testing in progress    Culture - Blood (collected 03 Jan 2025 22:50)  Source: .Blood BLOOD  Preliminary Report (05 Jan 2025 04:01):    No growth at 24 hours    Culture - Blood (collected 03 Jan 2025 22:45)  Source: .Blood BLOOD  Preliminary Report (05 Jan 2025 04:01):    No growth at 24 hours        Tele Reviewed:    RADIOLOGY & ADDITIONAL TESTS:  Results Reviewed:   Imaging Personally Reviewed:  Electrocardiogram Personally Reviewed:

## 2025-01-05 NOTE — OCCUPATIONAL THERAPY INITIAL EVALUATION ADULT - PERTINENT HX OF CURRENT PROBLEM, REHAB EVAL
63yo M transferred from [facility] for eval for encephalitis and subdural hematoma. Patient was brought in to  for AMS, headache, and found to be febrile there. CTH revealed a small atraumatic subdural hematoma and transferred to [facility] for neurosurgical intervention. Antibiosed with vanc and zosyn and s/p 2-3L fluid. Patient denies any chest pain, shortness of breath, back pain, abdominal pain, nausea, vomiting.  CT head/neck (1/4)Previously seen thin parafalcine subdural hemorrhage measuring up to 2 mm is not visualized in this CT, and favored to reflect artifact on the prior study.No clear evidence of acute intracranial hemorrhage. No significant mass effect or midline shift.CTA NECK:no evidence of significant stenosis or occlusion.CTA HEAD:No proximal large vessel occlusion.  Luminal irregularity of the bilateral vertebral artery V4 segments with   several short segment of at least moderate focal luminal narrowing, which   may be secondary to atherosclerotic disease versus vasculopathy/vasculitis. MR Head (1/4)1.  Mild subarachnoid/subdural blood, with reactive inflammation in the dura 2.  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

## 2025-01-05 NOTE — PROGRESS NOTE ADULT - PROBLEM SELECTOR PLAN 1
Concerning for meningitis - fever, HA, +nuchal rigidity and a + brudzinski's sign on neurology's exam.  - s/p vancomycin and Zosyn prior to transfer   - LP was performed and csf studies show an glucose 258, elevated proteins 56 and TNC of 9 with neutrophilic predominance.   - Blood culture negative  - CSF Culture gram stain NGTD    Plan:  - Start CTX 2g, Vancomycin, and Acyclovir with fluids  -  Pending HSV 1/2 PCR, borrelia, EBV PCR, GENEVIEVE virus DNA  - ID consulted  - MRI brain w wo contrast: mild subarachnoid/subdural blood, with reactive inflammation in the dura. 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  - Neuro check q4 Concerning for meningitis vs subdural hemorrhage (seen on CT at [facility]) : fever, HA, +nuchal rigidity and a + brudzinski's sign on neurology's exam.  - s/p vancomycin and Zosyn prior to transfer   - LP was performed and csf studies show an glucose 258, elevated proteins 56 and TNC of 9 with neutrophilic predominance.   - Blood culture negative  - CSF Culture gram stain NGTD  - MRI brain w wo contrast: mild subarachnoid/subdural blood, with reactive inflammation in the dura. 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  - WBC wnl 1/5/25    Plan:  - c/w CTX 2g, Vancomycin, and Acyclovir with fluids    -  Pending HSV 1/2 PCR, borrelia, EBV PCR, GENEVIEVE virus DNA  - ID consulted; appreciated recs  - Neuro check q4  - Neurology/neurosurgery consulted    - subdural/subarachnoid bleed on MRI and overnight hyperglycemic episode, etiology may be DM induced

## 2025-01-05 NOTE — PROGRESS NOTE ADULT - PROBLEM SELECTOR PLAN 4
Diet: regular, fall/asp precaution, s/s consult  DVT: scd as subdural on MRI  Dispo: pending - PT/OT Diet: regular, fall/asp precaution, s/s consult  DVT: scd as subdural on MRI  Dispo: PT pending /OT no skill needed

## 2025-01-05 NOTE — PROGRESS NOTE ADULT - ASSESSMENT
65 y/o male with h/o of HTN  presents as a transfer from [facility] for fever and headache. In CTH at [facility], patient was reportedly found to have a SDH, the imaging or arrival to [facility] was repeated and this was not seen - confirmed with neurology/neurosurgery of no subdural hematoma. Per radiology this may have been artifact as the follow up scan does not show this. s/p vancomycin and Zosyn prior to transfer and medications changed to CTX 2g, Vancomycin, and Acyclovir. On neuro exam, found to have nuchal rigidity and a + brudzinski's sign.  No focal neurologic deficits noted. LP was performed and csf studies show an elevated glucose, elevated proteins and TNC of 9 with neutrophilic predominance. ID consulted. Blood culture NGTD, pleural culture gram stain NGTD.     MRI mild subarachnoid/subdural blood, with reactive inflammation in the dura. 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       65 y/o male with h/o of HTN  presents as a transfer from [facility] for fever and headache. In CTH at [facility], patient was reportedly found to have a SDH, the imaging or arrival to [facility] was repeated and this was not seen. Per radiology this may have been artifact as the follow up scan does not show this. s/p vancomycin and Zosyn prior to transfer and medications changed to CTX 2g, Vancomycin, and Acyclovir. Neurology and neurosurgery consulted; on neuro's exam, +nuchal rigidity and + brudzinski's sign.  No focal neurologic deficits noted. LP was performed and csf studies show an elevated glucose, elevated proteins and TNC of 9 with neutrophilic predominance. ID consulted. Blood culture NGTD, pleural culture gram stain NGTD. MRI 1/5/25 mild subarachnoid/subdural blood, with reactive inflammation in the dura. 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     The course complicated by glucose over 500 - BHB 3.5, AG 23, and Bicarb 14. After insulin, improved to BHB 0/7, AG 14, bicarb 22, and gluc 226. Endo consulted and started on lantus 20 and mod SS.        63 y/o male with h/o of HTN  presents as a transfer from [facility] for fever and headache. In CTH at [facility], patient was reportedly found to have a SDH, the imaging or arrival to [facility] was repeated and this was not seen. Per radiology this may have been artifact as the follow up scan does not show this. s/p vancomycin and Zosyn prior to transfer and medications changed to CTX 2g, Vancomycin, and Acyclovir. Neurology and neurosurgery consulted; on neuro's exam, +nuchal rigidity and + brudzinski's sign.  No focal neurologic deficits noted. LP was performed and csf studies show an elevated glucose, elevated proteins and TNC of 9 with neutrophilic predominance. ID consulted. Blood culture NGTD, pleural culture gram stain NGTD. MRI 1/5/25 mild subarachnoid/subdural blood, with reactive inflammation in the dura. 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 The course complicated by glucose over 500 - BHB 3.5, AG 23, and Bicarb 14. After insulin, improved to BHB 0/7, AG 14, bicarb 22, and gluc 226. Endo consulted and started on lantus 20 and mod SS. Given subdural/subarachnoid bleed on MRI and overnight hyperglycemic episode, etiology may be DM induced.

## 2025-01-05 NOTE — CONSULT NOTE ADULT - ASSESSMENT
63 y/o male with pmhx of HTN, prior Anterior cervical discectomy and fusion w/ OSH Ortho 2023 presents as a transfer from [facility] for AMS. Patient has been lethargic x 4 days and developed fever with worsening confusion x 2 days.    Assessment:  #AMS  #Fever  [facility] WBC of 11 and finger stick glucose up to 520. S/P Vancomycin and Zosyn prior to transfer to [facility].   -+nuchal rigidity and a + brudzinski's sign on neurology's exam (per note)   -LP showing elevated glucose, elevated proteins and TNC of 9 with neutrophilic predominance. CSF Cx neg  -BCX NGTD, RVP neg   -MRI brain w/wo contrast with mild subarachnoid/subdural blood, with reactive inflammation in the dura. 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  -Pt travelled to [facility] 3 months ago, no issues till 4 days prior to admission  -Pt currently on Vanc, Ceftriaxone and Acyclovir    Recommendation:   65 y/o male with pmhx of HTN, prior Anterior cervical discectomy and fusion w/ OSH Ortho 2023 presents as a transfer from [facility] for AMS. Patient has been lethargic x 4 days and developed fever with worsening confusion x 2 days.    Assessment:  #AMS  #Fever  [facility] W[facility] of 11 and finger stick glucose up to 520. S/P Vancomycin and Zosyn prior to transfer to [facility].   -+nuchal rigidity and a + brudzinski's sign on neurology's exam (per note)   -LP showing elevated glucose, elevated proteins and TNC of 9 with neutrophilic predominance. CSF Cx neg, HSV neg   -BCX NGTD, RVP neg   -MRI brain w/wo contrast with mild subarachnoid/subdural blood, with reactive inflammation in the dura. 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  -Pt travelled to [facility] 3 months ago, no issues till 4 days prior to admission  -Pt currently on Vanc, Ceftriaxone and Acyclovir  -Pt with hx of prior Anterior cervical discectomy and fusion w/ OSH Ortho 2023    Recommendation:  -Cont Vanc and Ceftriaxone for now. Can d/c Acyclovir  -Check MR neck and cervical spine (with contrast if possible)  -Check CT C/A/P (with contrast if possible)  -Check MRSA swab, ESR, CRP, procal, EBV PCR/serology, CMV PCR   -Pt also with white lesion at the back of his throat, get throat swab  -Cont to monitor fever and WBC curve     Discussed w/ Medicine team    Yolie RHODES Fellow

## 2025-01-05 NOTE — PHYSICAL THERAPY INITIAL EVALUATION ADULT - ADDITIONAL COMMENTS
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

## 2025-01-05 NOTE — PROGRESS NOTE ADULT - PROBLEM SELECTOR PLAN 2
- glucose over 500s overnight of 1/4  - BHB 3.5 -> 0.7 after treatment  - AG 23  - A1c 11.8 1/4  - Endo consulted and appreciated recs    Plan:   - stat 10 units lantus 1/5.   - Increase nightly lantus to 20 units qhs   - Moderate correction scale lispro q6h. - glucose over 500s overnight of 1/4  - BHB 3.5 -> 0.7 after treatment  - AG 23  - A1c 11.8 1/4  - Endo consulted and appreciated recs    Plan:   - stat 10 units lantus 1/5.   - Increase nightly lantus to 20 units qhs   - Moderate correction scale lispro q6h.  - Started on atorvastatin 80mg

## 2025-01-05 NOTE — PHYSICAL THERAPY INITIAL EVALUATION ADULT - PLANNED THERAPY INTERVENTIONS, PT EVAL
Writer called patient and obtained the updated insurance information.  Information was updated in the system and procedures will be rerouted for Authorization through them.   PT will DC pt from PT program at this time, pt is functionally independent, please reconsult if any changes occur, pt in agreement.

## 2025-01-06 DIAGNOSIS — E04.1 NONTOXIC SINGLE THYROID NODULE: ICD-10-CM

## 2025-01-06 LAB
ADD ON TEST-SPECIMEN IN LAB: SIGNIFICANT CHANGE UP
ALBUMIN SERPL ELPH-MCNC: 3.5 G/DL — SIGNIFICANT CHANGE UP (ref 3.3–5)
ALP SERPL-CCNC: 77 U/L — SIGNIFICANT CHANGE UP (ref 40–120)
ALT FLD-CCNC: 19 U/L — SIGNIFICANT CHANGE UP (ref 10–45)
ANION GAP SERPL CALC-SCNC: 13 MMOL/L — SIGNIFICANT CHANGE UP (ref 5–17)
ANION GAP SERPL CALC-SCNC: 14 MMOL/L — SIGNIFICANT CHANGE UP (ref 5–17)
AST SERPL-CCNC: 18 U/L — SIGNIFICANT CHANGE UP (ref 10–40)
BASOPHILS # BLD AUTO: 0.03 K/UL — SIGNIFICANT CHANGE UP (ref 0–0.2)
BASOPHILS NFR BLD AUTO: 0.4 % — SIGNIFICANT CHANGE UP (ref 0–2)
BILIRUB SERPL-MCNC: 0.4 MG/DL — SIGNIFICANT CHANGE UP (ref 0.2–1.2)
BUN SERPL-MCNC: 12 MG/DL — SIGNIFICANT CHANGE UP (ref 7–23)
BUN SERPL-MCNC: 14 MG/DL — SIGNIFICANT CHANGE UP (ref 7–23)
CALCIUM SERPL-MCNC: 8.9 MG/DL — SIGNIFICANT CHANGE UP (ref 8.4–10.5)
CALCIUM SERPL-MCNC: 9 MG/DL — SIGNIFICANT CHANGE UP (ref 8.4–10.5)
CHLORIDE SERPL-SCNC: 102 MMOL/L — SIGNIFICANT CHANGE UP (ref 96–108)
CHLORIDE SERPL-SCNC: 96 MMOL/L — SIGNIFICANT CHANGE UP (ref 96–108)
CO2 SERPL-SCNC: 19 MMOL/L — LOW (ref 22–31)
CO2 SERPL-SCNC: 22 MMOL/L — SIGNIFICANT CHANGE UP (ref 22–31)
CREAT SERPL-MCNC: 0.78 MG/DL — SIGNIFICANT CHANGE UP (ref 0.5–1.3)
CREAT SERPL-MCNC: 1.1 MG/DL — SIGNIFICANT CHANGE UP (ref 0.5–1.3)
CRP SERPL-MCNC: <3 MG/L — SIGNIFICANT CHANGE UP (ref 0–4)
EBV EA AB SER IA-ACNC: <5 U/ML — SIGNIFICANT CHANGE UP
EBV EA AB TITR SER IF: POSITIVE
EBV EA IGG SER-ACNC: NEGATIVE — SIGNIFICANT CHANGE UP
EBV NA IGG SER IA-ACNC: 144 U/ML — HIGH
EBV PATRN SPEC IB-IMP: SIGNIFICANT CHANGE UP
EBV VCA IGG AVIDITY SER QL IA: POSITIVE
EBV VCA IGM SER IA-ACNC: 736 U/ML — HIGH
EBV VCA IGM SER IA-ACNC: <10 U/ML — SIGNIFICANT CHANGE UP
EBV VCA IGM TITR FLD: NEGATIVE — SIGNIFICANT CHANGE UP
EGFR: 101 ML/MIN/1.73M2 — SIGNIFICANT CHANGE UP
EGFR: 76 ML/MIN/1.73M2 — SIGNIFICANT CHANGE UP
EOSINOPHIL # BLD AUTO: 0.09 K/UL — SIGNIFICANT CHANGE UP (ref 0–0.5)
EOSINOPHIL NFR BLD AUTO: 1.3 % — SIGNIFICANT CHANGE UP (ref 0–6)
ERYTHROCYTE [SEDIMENTATION RATE] IN BLOOD: 39 MM/HR — HIGH (ref 0–20)
GLUCOSE BLDC GLUCOMTR-MCNC: 214 MG/DL — HIGH (ref 70–99)
GLUCOSE BLDC GLUCOMTR-MCNC: 218 MG/DL — HIGH (ref 70–99)
GLUCOSE BLDC GLUCOMTR-MCNC: 264 MG/DL — HIGH (ref 70–99)
GLUCOSE BLDC GLUCOMTR-MCNC: 270 MG/DL — HIGH (ref 70–99)
GLUCOSE BLDC GLUCOMTR-MCNC: 290 MG/DL — HIGH (ref 70–99)
GLUCOSE SERPL-MCNC: 237 MG/DL — HIGH (ref 70–99)
GLUCOSE SERPL-MCNC: 345 MG/DL — HIGH (ref 70–99)
HCT VFR BLD CALC: 40 % — SIGNIFICANT CHANGE UP (ref 39–50)
HCT VFR BLD CALC: 41.5 % — SIGNIFICANT CHANGE UP (ref 39–50)
HGB BLD-MCNC: 13.3 G/DL — SIGNIFICANT CHANGE UP (ref 13–17)
HGB BLD-MCNC: 14.1 G/DL — SIGNIFICANT CHANGE UP (ref 13–17)
HIV 1+2 AB+HIV1 P24 AG SERPL QL IA: SIGNIFICANT CHANGE UP
IMM GRANULOCYTES NFR BLD AUTO: 0.3 % — SIGNIFICANT CHANGE UP (ref 0–0.9)
LYMPHOCYTES # BLD AUTO: 1.92 K/UL — SIGNIFICANT CHANGE UP (ref 1–3.3)
LYMPHOCYTES # BLD AUTO: 28 % — SIGNIFICANT CHANGE UP (ref 13–44)
MAGNESIUM SERPL-MCNC: 1.9 MG/DL — SIGNIFICANT CHANGE UP (ref 1.6–2.6)
MAGNESIUM SERPL-MCNC: 1.9 MG/DL — SIGNIFICANT CHANGE UP (ref 1.6–2.6)
MCHC RBC-ENTMCNC: 27.8 PG — SIGNIFICANT CHANGE UP (ref 27–34)
MCHC RBC-ENTMCNC: 28 PG — SIGNIFICANT CHANGE UP (ref 27–34)
MCHC RBC-ENTMCNC: 33.3 G/DL — SIGNIFICANT CHANGE UP (ref 32–36)
MCHC RBC-ENTMCNC: 34 G/DL — SIGNIFICANT CHANGE UP (ref 32–36)
MCV RBC AUTO: 82.5 FL — SIGNIFICANT CHANGE UP (ref 80–100)
MCV RBC AUTO: 83.7 FL — SIGNIFICANT CHANGE UP (ref 80–100)
MONOCYTES # BLD AUTO: 0.58 K/UL — SIGNIFICANT CHANGE UP (ref 0–0.9)
MONOCYTES NFR BLD AUTO: 8.5 % — SIGNIFICANT CHANGE UP (ref 2–14)
MRSA PCR RESULT.: SIGNIFICANT CHANGE UP
NEUTROPHILS # BLD AUTO: 4.22 K/UL — SIGNIFICANT CHANGE UP (ref 1.8–7.4)
NEUTROPHILS NFR BLD AUTO: 61.5 % — SIGNIFICANT CHANGE UP (ref 43–77)
NRBC # BLD: 0 /100 WBCS — SIGNIFICANT CHANGE UP (ref 0–0)
NRBC # BLD: 0 /100 WBCS — SIGNIFICANT CHANGE UP (ref 0–0)
PHOSPHATE SERPL-MCNC: 3 MG/DL — SIGNIFICANT CHANGE UP (ref 2.5–4.5)
PHOSPHATE SERPL-MCNC: 3 MG/DL — SIGNIFICANT CHANGE UP (ref 2.5–4.5)
PLATELET # BLD AUTO: 228 K/UL — SIGNIFICANT CHANGE UP (ref 150–400)
PLATELET # BLD AUTO: 243 K/UL — SIGNIFICANT CHANGE UP (ref 150–400)
POTASSIUM SERPL-MCNC: 2.9 MMOL/L — CRITICAL LOW (ref 3.5–5.3)
POTASSIUM SERPL-MCNC: 3.4 MMOL/L — LOW (ref 3.5–5.3)
POTASSIUM SERPL-SCNC: 2.9 MMOL/L — CRITICAL LOW (ref 3.5–5.3)
POTASSIUM SERPL-SCNC: 3.4 MMOL/L — LOW (ref 3.5–5.3)
PROCALCITONIN SERPL-MCNC: 0.06 NG/ML — SIGNIFICANT CHANGE UP (ref 0.02–0.1)
PROT SERPL-MCNC: 7.1 G/DL — SIGNIFICANT CHANGE UP (ref 6–8.3)
RBC # BLD: 4.78 M/UL — SIGNIFICANT CHANGE UP (ref 4.2–5.8)
RBC # BLD: 5.03 M/UL — SIGNIFICANT CHANGE UP (ref 4.2–5.8)
RBC # FLD: 13.9 % — SIGNIFICANT CHANGE UP (ref 10.3–14.5)
RBC # FLD: 14.5 % — SIGNIFICANT CHANGE UP (ref 10.3–14.5)
S AUREUS DNA NOSE QL NAA+PROBE: DETECTED
SODIUM SERPL-SCNC: 128 MMOL/L — LOW (ref 135–145)
SODIUM SERPL-SCNC: 138 MMOL/L — SIGNIFICANT CHANGE UP (ref 135–145)
VANCOMYCIN TROUGH SERPL-MCNC: 8.3 UG/ML — LOW (ref 10–20)
VDRL CSF-TITR: SIGNIFICANT CHANGE UP
WBC # BLD: 6.86 K/UL — SIGNIFICANT CHANGE UP (ref 3.8–10.5)
WBC # BLD: 7.74 K/UL — SIGNIFICANT CHANGE UP (ref 3.8–10.5)
WBC # FLD AUTO: 6.86 K/UL — SIGNIFICANT CHANGE UP (ref 3.8–10.5)
WBC # FLD AUTO: 7.74 K/UL — SIGNIFICANT CHANGE UP (ref 3.8–10.5)
WNV IGG CSF IA-ACNC: NEGATIVE — SIGNIFICANT CHANGE UP
WNV IGM CSF IA-ACNC: NEGATIVE — SIGNIFICANT CHANGE UP

## 2025-01-06 PROCEDURE — 72156 MRI NECK SPINE W/O & W/DYE: CPT | Mod: 26

## 2025-01-06 PROCEDURE — 99232 SBSQ HOSP IP/OBS MODERATE 35: CPT

## 2025-01-06 PROCEDURE — G0545: CPT

## 2025-01-06 PROCEDURE — 99232 SBSQ HOSP IP/OBS MODERATE 35: CPT | Mod: GC

## 2025-01-06 PROCEDURE — 70543 MRI ORBT/FAC/NCK W/O &W/DYE: CPT | Mod: 26

## 2025-01-06 PROCEDURE — 74177 CT ABD & PELVIS W/CONTRAST: CPT | Mod: 26

## 2025-01-06 PROCEDURE — 71260 CT THORAX DX C+: CPT | Mod: 26

## 2025-01-06 RX ORDER — VANCOMYCIN HYDROCHLORIDE 5 G/100ML
1750 INJECTION, POWDER, LYOPHILIZED, FOR SOLUTION INTRAVENOUS EVERY 12 HOURS
Refills: 0 | Status: DISCONTINUED | OUTPATIENT
Start: 2025-01-06 | End: 2025-01-07

## 2025-01-06 RX ORDER — INSULIN LISPRO 100/ML
VIAL (ML) SUBCUTANEOUS
Refills: 0 | Status: DISCONTINUED | OUTPATIENT
Start: 2025-01-06 | End: 2025-01-07

## 2025-01-06 RX ORDER — POTASSIUM CHLORIDE 600 MG/1
40 TABLET, FILM COATED, EXTENDED RELEASE ORAL ONCE
Refills: 0 | Status: COMPLETED | OUTPATIENT
Start: 2025-01-06 | End: 2025-01-06

## 2025-01-06 RX ORDER — INSULIN LISPRO 100/ML
8 VIAL (ML) SUBCUTANEOUS
Refills: 0 | Status: DISCONTINUED | OUTPATIENT
Start: 2025-01-06 | End: 2025-01-07

## 2025-01-06 RX ORDER — POTASSIUM CHLORIDE 600 MG/1
10 TABLET, FILM COATED, EXTENDED RELEASE ORAL
Refills: 0 | Status: COMPLETED | OUTPATIENT
Start: 2025-01-06 | End: 2025-01-06

## 2025-01-06 RX ORDER — LISINOPRIL 30 MG/1
5 TABLET ORAL DAILY
Refills: 0 | Status: DISCONTINUED | OUTPATIENT
Start: 2025-01-06 | End: 2025-01-06

## 2025-01-06 RX ORDER — INSULIN GLARGINE-YFGN 100 [IU]/ML
28 INJECTION, SOLUTION SUBCUTANEOUS ONCE
Refills: 0 | Status: COMPLETED | OUTPATIENT
Start: 2025-01-06 | End: 2025-01-06

## 2025-01-06 RX ORDER — LISINOPRIL 30 MG/1
10 TABLET ORAL DAILY
Refills: 0 | Status: DISCONTINUED | OUTPATIENT
Start: 2025-01-06 | End: 2025-01-07

## 2025-01-06 RX ORDER — INSULIN LISPRO 100/ML
VIAL (ML) SUBCUTANEOUS AT BEDTIME
Refills: 0 | Status: DISCONTINUED | OUTPATIENT
Start: 2025-01-06 | End: 2025-01-07

## 2025-01-06 RX ADMIN — VANCOMYCIN HYDROCHLORIDE 250 MILLIGRAM(S): 5 INJECTION, POWDER, LYOPHILIZED, FOR SOLUTION INTRAVENOUS at 19:57

## 2025-01-06 RX ADMIN — POTASSIUM CHLORIDE 100 MILLIEQUIVALENT(S): 600 TABLET, FILM COATED, EXTENDED RELEASE ORAL at 19:57

## 2025-01-06 RX ADMIN — Medication 5 UNIT(S): at 11:31

## 2025-01-06 RX ADMIN — VANCOMYCIN HYDROCHLORIDE 300 MILLIGRAM(S): 5 INJECTION, POWDER, LYOPHILIZED, FOR SOLUTION INTRAVENOUS at 05:33

## 2025-01-06 RX ADMIN — SODIUM CHLORIDE 100 MILLILITER(S): 9 INJECTION, SOLUTION INTRAVENOUS at 05:33

## 2025-01-06 RX ADMIN — ATORVASTATIN CALCIUM 80 MILLIGRAM(S): 40 TABLET, FILM COATED ORAL at 21:26

## 2025-01-06 RX ADMIN — LISINOPRIL 10 MILLIGRAM(S): 30 TABLET ORAL at 16:04

## 2025-01-06 RX ADMIN — HYDRALAZINE HYDROCHLORIDE 10 MILLIGRAM(S): 10 TABLET ORAL at 05:34

## 2025-01-06 RX ADMIN — Medication 5 UNIT(S): at 07:30

## 2025-01-06 RX ADMIN — Medication 3: at 16:17

## 2025-01-06 RX ADMIN — CEFTRIAXONE SODIUM 100 MILLIGRAM(S): 1 INJECTION, POWDER, FOR SOLUTION INTRAMUSCULAR; INTRAVENOUS at 17:30

## 2025-01-06 RX ADMIN — POTASSIUM CHLORIDE 40 MILLIEQUIVALENT(S): 600 TABLET, FILM COATED, EXTENDED RELEASE ORAL at 17:31

## 2025-01-06 RX ADMIN — Medication 1: at 21:27

## 2025-01-06 RX ADMIN — Medication 8 UNIT(S): at 16:17

## 2025-01-06 RX ADMIN — Medication 2: at 02:26

## 2025-01-06 RX ADMIN — POTASSIUM CHLORIDE 40 MILLIEQUIVALENT(S): 600 TABLET, FILM COATED, EXTENDED RELEASE ORAL at 07:29

## 2025-01-06 RX ADMIN — POTASSIUM CHLORIDE 100 MILLIEQUIVALENT(S): 600 TABLET, FILM COATED, EXTENDED RELEASE ORAL at 15:14

## 2025-01-06 RX ADMIN — CEFTRIAXONE SODIUM 100 MILLIGRAM(S): 1 INJECTION, POWDER, FOR SOLUTION INTRAMUSCULAR; INTRAVENOUS at 05:33

## 2025-01-06 RX ADMIN — NIFEDIPINE 60 MILLIGRAM(S): 60 TABLET, EXTENDED RELEASE ORAL at 05:34

## 2025-01-06 RX ADMIN — INSULIN GLARGINE-YFGN 28 UNIT(S): 100 INJECTION, SOLUTION SUBCUTANEOUS at 13:53

## 2025-01-06 RX ADMIN — LISINOPRIL 5 MILLIGRAM(S): 30 TABLET ORAL at 11:42

## 2025-01-06 RX ADMIN — Medication 2: at 05:43

## 2025-01-06 RX ADMIN — POTASSIUM CHLORIDE 100 MILLIEQUIVALENT(S): 600 TABLET, FILM COATED, EXTENDED RELEASE ORAL at 07:30

## 2025-01-06 NOTE — SWALLOW BEDSIDE ASSESSMENT ADULT - SLP GENERAL OBSERVATIONS
Patient received OOB in chair; AAOx4; spouse present. Able to answer all questions and follow commands for the purpose of this evaluation.

## 2025-01-06 NOTE — PROGRESS NOTE ADULT - PROBLEM SELECTOR PLAN 4
Diet: regular, fall/asp precaution, s/s consult  DVT: scd as subdural on MRI  Dispo: PT/OT no skill needed - c/w home med: nifedipine 60mg daily  - Start Lisinopril 10mg daily

## 2025-01-06 NOTE — PROGRESS NOTE ADULT - TIME BILLING
Time-based billing (NON-critical care).     The necessity of the time spent during the encounter on this date of service was due to:     - Ordering, reviewing, and interpreting labs, testing, and imaging.  - Independently obtaining a review of systems and performing a physical exam  - Reviewing prior hospitalization and where necessary, outpatient records.  - Counselling and educating patient and/or family regarding interpretation of aforementioned items and plan of care.
Time-based billing (NON-critical care).     The necessity of the time spent during the encounter on this date of service was due to:     - Ordering, reviewing, and interpreting labs, testing, and imaging.  - Independently obtaining a review of systems and performing a physical exam  - Reviewing prior hospitalization and where necessary, outpatient records.  - Counselling and educating patient and/or family regarding interpretation of aforementioned items and plan of care.

## 2025-01-06 NOTE — SWALLOW BEDSIDE ASSESSMENT ADULT - ADDITIONAL RECOMMENDATIONS
long term goal: the patient will tolerate the least restrictive PO diet without showing overt s/s of aspiration

## 2025-01-06 NOTE — PROGRESS NOTE ADULT - PROBLEM SELECTOR PLAN 3
- c/w home med: nifedipine 60mg daily  - start Hydralazine PO 10mg BID given HTN over 170 - MR neck: nodule in the right lobe of the thyroid.  - f/u outpatient

## 2025-01-06 NOTE — CHART NOTE - NSCHARTNOTEFT_GEN_A_CORE
Endocrine follow up.     64y Male with pmhx of HTN, prior Anterior cervical discectomy and fusion w/ OSH Ortho 2023 presents as a transfer from [facility] for AMS and encephalitis. Overnight, patient had extreme BG elevation to 600 and labs consistent with DKA, he was treated with IVF and subcutaneous insulin with resolution of ketoacidosis. Patient also found to have A1c 11.8%, Endocrine team consulted for uncontrolled diabetes. Patient is high risk with high level decision making due to uncontrolled diabetes which places patient at high risk for cardiovascular and cerebrovascular events. Patient with lability of glucose requiring close monitoring and insulin adjustments.    POCT Blood Glucose:  270 mg/dL (01-06-25 @ 11:27) 5A   214 mg/dL (01-06-25 @ 05:41) 2A, 5A  218 mg/dL (01-06-25 @ 01:53) 2A  289 mg/dL (01-05-25 @ 21:04) 20L, 3A  277 mg/dL (01-05-25 @ 16:27) 3A, 5A    eMAR:  insulin glargine Injectable (LANTUS)   20 Unit(s) SubCutaneous (01-05-25 @ 21:35)    insulin lispro (ADMELOG) corrective regimen sliding scale   2 Unit(s) SubCutaneous (01-06-25 @ 05:43)   2 Unit(s) SubCutaneous (01-06-25 @ 02:26)   3 Unit(s) SubCutaneous (01-05-25 @ 21:35)   3 Unit(s) SubCutaneous (01-05-25 @ 16:32)    insulin lispro Injectable (ADMELOG)   5 Unit(s) SubCutaneous (01-06-25 @ 11:31)   5 Unit(s) SubCutaneous (01-06-25 @ 07:30)   5 Unit(s) SubCutaneous (01-05-25 @ 16:33)    **Also received Lantus 10 units at 10AM on 1/5/25    Serum glucose today .       # T2DM with hyperglycemia   # DKA- now resolved s/p IVF and subcutaneous insulin  - Most recent Hemoglobin A1C 11.8%  - Current diet: carb consistent  - Please monitor blood glucose values TID AC & QHS while eating regular meals and Q6H while NPO  - Blood glucose goals pre-meal less than 140 mg/dL and random blood glucose less than 180 mg/dL    Recommendations:   - Increased scheduled glargine to 28 units QHS starting tonight (1/6/25)  - Increased to lispro 8 units scheduled TID before meals   - recommend low correction scale lispro at mealtimes  - recommend separate low correction scale lispro at bedtime    Discharge planning:   - Discharge DM medications: can consider regimen of GLP1RA weekly (if insurance covered), Daily basal (dose TBD closer to discharge), and metformin 500 mg BID  - Send Dexcom g7 sensors to pharmacy (check insurance coverage)  - Physician to RN -> Start Insulin Lantus Pen teaching  - Please ensure patient has the following diabetes supplies:  - Glucometer (ACCU_CHECK jeana Connect, Ascensia Contour Next EZ or One, Freestyle Freedome LITE or OneTouch Verio IQ)  - Glucometer test strips and lancets  (make sure compatible w/ glucometer), Dispense #100 (or #200) use as directed  - Lantus Solostar Pen (or Basaglar Kwikpen) ____ units before bedtime (dose TBD)  - BD mitchell 4mm pen needles.   - Please verify with pharmacy that each script is covered before discharge.  - Patient will need opthalmology and podiatry follow up as outpatient   Please provide patient with below information for endocrinology follow up:  PATIENT ACCESS SERVICES  [phone]  For all [facility] Endocrine practices phone numbers and locations throughout [facility], and [place].      # HTN  - BP goal 130/80  - On hydralazine 10 mg BID and nifedipine XL 60 mg daily  - Manage per primary team     # HLD  - Check fasting lipid panel, manage as outpatient  - Goal LDL<70    Lakeshia Jewell DO   Attending Physician   Department of Endocrinology, Diabetes and Metabolism     If before 9AM or after 5PM, or on weekends/holidays, please call the Endocrine answering service for assistance ([phone]).  For nonurgent matters, please email [email or web address] or [email or web address]     Please note that this patient may be followed by different provider tomorrow.  Notify endocrine 24 hours prior to discharge for final recommendations
Case discussed and imaging reviewed with stroke fellow Dr. Manav Zazueta.    -It is possible that abnormalities in b/l vertebral artery caliber could reflect vessel inflammation, although it is difficult to say definitively - if so, it could be in the context CNS inflammation for which pt is being actively managed. Given the absence of acute stroke, would not require any neurovascular intervention or antithrombotics.  -From neurovascular standpoint, no current indication for antithrombotics. Given history of chronic infarct (but with no known clinical stroke), can f/u with vascular neurology outpatient  -Defer management of SDH and SAH to Neurosurgery  -Defer infection management to Infectious Disease.    Can contact vascular neurology at [phone] if there are further questions
(4) History of Falls or Infant-Toddler Placed in Bed

## 2025-01-06 NOTE — PROGRESS NOTE ADULT - SUBJECTIVE AND OBJECTIVE BOX
64yPatient is a 64y old  Male who presents with a chief complaint of fever, HA (06 Jan 2025 07:56)      Interval history:  Afebrile, spouse at bedside, per her pt 80% improved.       Allergies:   Cipro (Unknown)      Antimicrobials:  cefTRIAXone   IVPB 2000 milliGRAM(s) IV Intermittent every 12 hours  vancomycin  IVPB 1500 milliGRAM(s) IV Intermittent every 12 hours      REVIEW OF SYSTEMS:  No chest pain   No SOB  No abdominal pain  No dysuria   No rash.       Vital Signs Last 24 Hrs  T(C): 36.4 (01-06-25 @ 16:28), Max: 36.7 (01-05-25 @ 19:57)  T(F): 97.6 (01-06-25 @ 16:28), Max: 98.1 (01-05-25 @ 19:57)  HR: 65 (01-06-25 @ 16:28) (60 - 92)  BP: 176/100 (01-06-25 @ 16:28) (151/97 - 176/100)  BP(mean): --  RR: 18 (01-06-25 @ 16:28) (18 - 18)  SpO2: 99% (01-06-25 @ 16:28) (96% - 99%)      PHYSICAL EXAM:  Pt in no acute distress, alert, awake.   no neck stiffness  non distended abdomen  no edema LE   no phlebitis                             13.3   7.74  )-----------( 228      ( 06 Jan 2025 15:39 )             40.0   01-06    128[L]  |  96  |  14  ----------------------------<  345[H]  3.4[L]   |  19[L]  |  1.10    Ca    9.0      06 Jan 2025 15:39  Phos  3.0     01-06  Mg     1.9     01-06    TPro  7.1  /  Alb  3.5  /  TBili  0.4  /  DBili  x   /  AST  18  /  ALT  19  /  AlkPhos  77  01-06      LIVER FUNCTIONS - ( 06 Jan 2025 06:12 )  Alb: 3.5 g/dL / Pro: 7.1 g/dL / ALK PHOS: 77 U/L / ALT: 19 U/L / AST: 18 U/L / GGT: x               Culture - Acid Fast - CSF (collected 03 Jan 2025 23:29)  Source: .CSF CSF    Culture - CSF with Gram Stain (collected 03 Jan 2025 23:29)  Source: .CSF CSF  Gram Stain (04 Jan 2025 03:31):    polymorphonuclear leukocytes seen    No organisms seen    by cytocentrifuge  Preliminary Report (04 Jan 2025 18:22):    No growth to date    Culture - Fungal, CSF (collected 03 Jan 2025 23:29)  Source: .CSF CSF  Preliminary Report (05 Jan 2025 10:24):    No growth    Culture - Blood (collected 03 Jan 2025 22:50)  Source: .Blood BLOOD  Preliminary Report (06 Jan 2025 04:01):    No growth at 48 Hours    Culture - Blood (collected 03 Jan 2025 22:45)  Source: .Blood BLOOD  Preliminary Report (06 Jan 2025 04:01):    No growth at 48 Hours        Radiology:    < from: MR Cervical Spine w/wo IV Cont (01.06.25 @ 11:16) >  IMPRESSION:    1.  No gross evidence of critical narrowing of the spinal canal or   abnormal cord signal.  2.  Mild degenerative changes of the cervical spine.  3.  Postoperative changes related to an ACDF of C6-C7.  4.  Nodule in the right lobe of the thyroid. Recommend nonemergent   thyroid ultrasound for further characterization.

## 2025-01-06 NOTE — SWALLOW BEDSIDE ASSESSMENT ADULT - PHARYNGEAL PHASE
independent repeat swallows and liquid washes, still c/o globus sensation; no overt s/s of aspiration observed/Complaints of pharyngeal stasis no overt s/s of aspiration/Within functional limits

## 2025-01-06 NOTE — PROGRESS NOTE ADULT - ASSESSMENT
65 y/o male with h/o of HTN  presents as a transfer from [facility] for fever and headache. In CTH at [facility], patient was reportedly found to have a SDH, the imaging or arrival to [facility] was repeated and this was not seen. Per radiology this may have been artifact as the follow up scan does not show this. s/p vancomycin and Zosyn prior to transfer and medications changed to CTX 2g, Vancomycin, and Acyclovir. Neurology and neurosurgery consulted; on neuro's exam, +nuchal rigidity and + brudzinski's sign.  No focal neurologic deficits noted. LP was performed and csf studies show an elevated glucose, elevated proteins and TNC of 9 with neutrophilic predominance. ID consulted. Blood culture NGTD, pleural culture gram stain NGTD. MRI 1/5/25 mild subarachnoid/subdural blood, with reactive inflammation in the dura. 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 The course complicated by glucose over 500 - BHB 3.5, AG 23, and Bicarb 14. After insulin, improved to BHB 0/7, AG 14, bicarb 22, and gluc 226. Endo consulted and started on lantus 20 and mod SS. Given subdural/subarachnoid bleed on MRI and overnight hyperglycemic episode, etiology may be DM induced.  ID consulted.       63 y/o male with h/o of HTN  presents as a transfer from [facility] for fever and headache. In CTH at [facility], patient was reportedly found to have a SDH, the imaging or arrival to [facility] was repeated and this was not seen. Per radiology this may have been artifact as the follow up scan does not show this. s/p vancomycin and Zosyn prior to transfer and medications changed to CTX 2g, Vancomycin, and Acyclovir. Neurology and neurosurgery consulted; on neuro's exam, +nuchal rigidity and + brudzinski's sign.  No focal neurologic deficits noted. LP was performed and csf studies show an elevated glucose, elevated proteins and TNC of 9 with neutrophilic predominance. ID consulted. Blood culture NGTD, pleural culture gram stain NGTD. MRI 1/5/25 mild subarachnoid/subdural blood, with reactive inflammation in the dura. 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 The course complicated by glucose over 500 - BHB 3.5, AG 23, and Bicarb 14. After insulin, improved to BHB 0.7, AG 14, bicarb 22, and gluc 226. Endo consulted and started on lantus 20 and mod SS. Given subdural/subarachnoid bleed on MRI and overnight hyperglycemic episode, etiology may be DM induced.  ID consulted and recommended more infectious work up.

## 2025-01-06 NOTE — PROGRESS NOTE ADULT - PROBLEM SELECTOR PLAN 1
Concerning for meningitis vs subdural hemorrhage (seen on CT at [facility]) : fever, HA, +nuchal rigidity and a + brudzinski's sign on neurology's exam.  - s/p vancomycin and Zosyn prior to transfer   - LP was performed and csf studies show an glucose 258, elevated proteins 56 and TNC of 9 with neutrophilic predominance.   - Blood culture negative; CSF Culture gram stain NGTD  - MRI brain w wo contrast: mild subarachnoid/subdural blood, with reactive inflammation in the dura. 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  - WBC wnl 1/5/25  - s/p acyclovir    Plan:  - c/w CTX 2g, Vancomycin    -  Pending HSV 1/2 PCR, borrelia, EBV PCR/serology, GENEVIEVE virus DNA, CMV    - MRSA swab, ESR, CRP, procal    - Pt also with exudative lesion at the back of his throat, get throat swab  - ID consulted; appreciated recs  - MR c spine/neck with contrast and CT CAP with contrast ordered  - Neuro check q4  - Neurology/neurosurgery consulted  -  subdural/subarachnoid bleed on MRI and overnight hyperglycemic episode, etiology may be DM induced Concerning for meningitis vs subdural hemorrhage (seen on CT at [facility]) : fever, HA, +nuchal rigidity and a + brudzinski's sign on neurology's exam.  - s/p vancomycin and Zosyn prior to transfer   - LP was performed and csf studies show an glucose 258, elevated proteins 56 and TNC of 9 with neutrophilic predominance.   - Blood culture negative; CSF Culture gram stain NGTD  - MRI brain w wo contrast: mild subarachnoid/subdural blood, with reactive inflammation in the dura. 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  - WBC wnl 1/5/25  - s/p acyclovir    Plan:  - c/w CTX 2g, Vancomycin    -  Pending HSV 1/2 PCR, borrelia, EBV PCR/serology, GENEVIEVE virus DNA, CMV    - MRSA swab, ESR, CRP, procal    - Pt also with exudative lesion at the back of his throat, get throat swab  - ID consulted; appreciated recs  - MR c spine/neck with contrast and CT CAP with contrast ordered  - Neuro check q4  - Neurology/neurosurgery consulted  - Given subdural/subarachnoid bleed on MRI and overnight hyperglycemic episode, etiology may be DM induced, will follow up with neuro/neurosurg Concerning for meningitis vs subdural hemorrhage (seen on CT at [facility]) : fever, HA, +nuchal rigidity and a + brudzinski's sign on neurology's exam. s/p vancomycin and Zosyn prior to transfer   - LP was performed and csf studies show an glucose 258, elevated proteins 56 and TNC of 9 with neutrophilic predominance.   - Blood culture negative; CSF Culture gram stain NGTD  - MRI brain w wo contrast: mild subarachnoid/subdural blood, with reactive inflammation in the dura. 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  - MR c spine/neck with contrast: Mild degenerative changes of the cervical spine.   - WBC wnl 1/5/25, CPR wnl  - s/p acyclovir    Plan:  - c/w CTX 2g, Vancomycin    - EBV: past infection; Staph a PCR +, ESR elev 39    - Cryptoc, [place], HIV 1/2, MRSA, HSV 1/2 PCR, CRP, procal negative    - Pending borrelia, GENEVIEVE virus DNA, CMV    - Pt also with exudative lesion at the back of his throat, get throat swab  - ID consulted; appreciated recs  - CT CAP ordered  - Neuro check q4  - Neurology/neurosurgery consulted  - Given subdural/subarachnoid bleed on MRI and overnight hyperglycemic episode, etiology may be DM induced,        - neurology: no acute intervention needed at this time

## 2025-01-06 NOTE — PROGRESS NOTE ADULT - ASSESSMENT
63 y/o male with pmhx of HTN, prior Anterior cervical discectomy and fusion w/ OSH Ortho 2023 presents as a transfer from [facility] for AMS. Patient has been lethargic x 4 days and developed fever with worsening confusion x 2 days.    Assessment:  #AMS, improving  #Fever resolved  #exudative pharyngitis       [facility] 11 and finger stick glucose up to 520. S/P Vancomycin and Zosyn prior to transfer to [facility].   -+nuchal rigidity and a + brudzinski's sign on neurology's exam (per note) , not present on our exam.   -LP showing elevated glucose, elevated proteins and TNC of 9 with neutrophilic predominance. CSF Cx neg, HSV neg   -BCX NGTD, RVP neg   -MRI brain w/wo contrast with mild subarachnoid/subdural blood, with reactive inflammation in the dura. 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  -Pt travelled to [facility] 3 months ago, no issues till 4 days prior to admission  -Pt currently on Vanc, Ceftriaxone and Acyclovir  -Pt with hx of prior Anterior cervical discectomy and fusion w/ OSH Ortho 2023        Recommendation:  -Cont Ceftriaxone for now.   -Can d/c vancomycin  -MR c spine with no infection  -pending CT C/A/P (with contrast if possible)  -MRSA PCR, ESR, CRP, procal noted, normal procal.   - EBV serology, with past infection, HIV negative   -CMV, EBV PCR in lab, thorat cx in lab  -Cont to monitor fever and WBC curve         Sabrina Shelby  Please contact through MS Teams   If no response or past 5 pm/weekend call [phone].

## 2025-01-06 NOTE — PROGRESS NOTE ADULT - SUBJECTIVE AND OBJECTIVE BOX
PROGRESS NOTE:   Authored by Dr. Kathleen Zimmerman MD (PGY-1).  via TEAMS    Patient is a 64y old  Male who presents with a chief complaint of fever, HA (05 Jan 2025 09:47)      SUBJECTIVE / OVERNIGHT EVENTS:  No acute events overnight.     ADDITIONAL REVIEW OF SYSTEMS:  Patient denies fevers, chills, chest pain, shortness of breath, nausea, abdominal pain, diarrhea, constipation, dysuria, leg swelling, headache, light headedness.    MEDICATIONS  (STANDING):  atorvastatin 80 milliGRAM(s) Oral at bedtime  cefTRIAXone   IVPB 2000 milliGRAM(s) IV Intermittent every 12 hours  dextrose 5%. 1000 milliLiter(s) (50 mL/Hr) IV Continuous <Continuous>  dextrose 5%. 1000 milliLiter(s) (100 mL/Hr) IV Continuous <Continuous>  dextrose 50% Injectable 25 Gram(s) IV Push once  dextrose 50% Injectable 12.5 Gram(s) IV Push once  dextrose 50% Injectable 25 Gram(s) IV Push once  glucagon  Injectable 1 milliGRAM(s) IntraMuscular once  hydrALAZINE 10 milliGRAM(s) Oral two times a day  insulin glargine Injectable (LANTUS) 20 Unit(s) SubCutaneous at bedtime  insulin lispro (ADMELOG) corrective regimen sliding scale   SubCutaneous every 4 hours  insulin lispro Injectable (ADMELOG) 5 Unit(s) SubCutaneous three times a day before meals  lactated ringers. 1000 milliLiter(s) (100 mL/Hr) IV Continuous <Continuous>  NIFEdipine XL 60 milliGRAM(s) Oral daily  potassium chloride  10 mEq/100 mL IVPB 10 milliEquivalent(s) IV Intermittent every 1 hour  vancomycin  IVPB 1500 milliGRAM(s) IV Intermittent every 12 hours    MEDICATIONS  (PRN):  dextrose Oral Gel 15 Gram(s) Oral once PRN Blood Glucose LESS THAN 70 milliGRAM(s)/deciliter  melatonin 3 milliGRAM(s) Oral at bedtime PRN Insomnia      CAPILLARY BLOOD GLUCOSE      POCT Blood Glucose.: 214 mg/dL (06 Jan 2025 05:41)  POCT Blood Glucose.: 218 mg/dL (06 Jan 2025 01:53)  POCT Blood Glucose.: 289 mg/dL (05 Jan 2025 21:04)  POCT Blood Glucose.: 277 mg/dL (05 Jan 2025 16:27)  POCT Blood Glucose.: 211 mg/dL (05 Jan 2025 11:26)  POCT Blood Glucose.: 226 mg/dL (05 Jan 2025 09:29)    I&O's Summary    05 Jan 2025 07:01  -  06 Jan 2025 07:00  --------------------------------------------------------  IN: 2740 mL / OUT: 2200 mL / NET: 540 mL        PHYSICAL EXAM:  Vital Signs Last 24 Hrs  T(C): 36.5 (06 Jan 2025 04:46), Max: 36.8 (05 Jan 2025 12:59)  T(F): 97.7 (06 Jan 2025 04:46), Max: 98.2 (05 Jan 2025 12:59)  HR: 60 (06 Jan 2025 04:46) (60 - 81)  BP: 176/93 (06 Jan 2025 04:46) (150/90 - 176/93)  BP(mean): --  RR: 18 (06 Jan 2025 04:46) (18 - 18)  SpO2: 99% (06 Jan 2025 04:46) (95% - 99%)    Parameters below as of 06 Jan 2025 04:46  Patient On (Oxygen Delivery Method): room air        CONSTITUTIONAL: NAD, well-developed  HEENT: Atraumatic, Normocephalic, EOMI, PERRL, Conjunctiva and sclera clear, moist mucous membranes  Neck: Supple, no elevated JVP.  RESPIRATORY: Normal respiratory effort; lungs are clear to auscultation bilaterally  CARDIOVASCULAR: Regular rate and rhythm, normal S1 and S2, no murmur/rub/gallop  ABDOMEN: Nontender to palpation, normoactive bowel sounds, no rebound/guarding; No hepatosplenomegaly  MSK: no clubbing or cyanosis of digits; no joint swelling or tenderness to palpation; no lower extremity edema; Peripheral pulses are 2+ bilaterally  NEURO: AOx3, no focal deficits  SKIN: No rashes or lesions    LABS:                        14.1   6.86  )-----------( 243      ( 06 Jan 2025 06:12 )             41.5     01-06    138  |  102  |  12  ----------------------------<  237[H]  2.9[LL]   |  22  |  0.78    Ca    8.9      06 Jan 2025 06:12  Phos  3.0     01-06  Mg     1.9     01-06    TPro  7.1  /  Alb  3.5  /  TBili  0.4  /  DBili  x   /  AST  18  /  ALT  19  /  AlkPhos  77  01-06          Urinalysis Basic - ( 06 Jan 2025 06:12 )    Color: x / Appearance: x / SG: x / pH: x  Gluc: 237 mg/dL / Ketone: x  / Bili: x / Urobili: x   Blood: x / Protein: x / Nitrite: x   Leuk Esterase: x / RBC: x / WBC x   Sq Epi: x / Non Sq Epi: x / Bacteria: x        Culture - Acid Fast - CSF (collected 03 Jan 2025 23:29)  Source: .CSF CSF    Culture - CSF with Gram Stain (collected 03 Jan 2025 23:29)  Source: .CSF CSF  Gram Stain (04 Jan 2025 03:31):    polymorphonuclear leukocytes seen    No organisms seen    by cytocentrifuge  Preliminary Report (04 Jan 2025 18:22):    No growth to date    Culture - Fungal, CSF (collected 03 Jan 2025 23:29)  Source: .CSF CSF  Preliminary Report (05 Jan 2025 10:24):    No growth    Culture - Blood (collected 03 Jan 2025 22:50)  Source: .Blood BLOOD  Preliminary Report (06 Jan 2025 04:01):    No growth at 48 Hours    Culture - Blood (collected 03 Jan 2025 22:45)  Source: .Blood BLOOD  Preliminary Report (06 Jan 2025 04:01):    No growth at 48 Hours        Tele Reviewed:    RADIOLOGY & ADDITIONAL TESTS:  Results Reviewed:   Imaging Personally Reviewed:  Electrocardiogram Personally Reviewed:     PROGRESS NOTE:   Authored by Dr. Kathleen Zimmerman MD (PGY-1).  via TEAMS    Patient is a 64y old  Male who presents with a chief complaint of fever, HA (05 Jan 2025 09:47)      SUBJECTIVE / OVERNIGHT EVENTS:  No acute events overnight.     ADDITIONAL REVIEW OF SYSTEMS:  Patient denies fevers, chills, chest pain, shortness of breath, nausea, abdominal pain, diarrhea, constipation, dysuria, leg swelling, headache, light headedness.    MEDICATIONS  (STANDING):  atorvastatin 80 milliGRAM(s) Oral at bedtime  cefTRIAXone   IVPB 2000 milliGRAM(s) IV Intermittent every 12 hours  dextrose 5%. 1000 milliLiter(s) (50 mL/Hr) IV Continuous <Continuous>  dextrose 5%. 1000 milliLiter(s) (100 mL/Hr) IV Continuous <Continuous>  dextrose 50% Injectable 25 Gram(s) IV Push once  dextrose 50% Injectable 12.5 Gram(s) IV Push once  dextrose 50% Injectable 25 Gram(s) IV Push once  glucagon  Injectable 1 milliGRAM(s) IntraMuscular once  hydrALAZINE 10 milliGRAM(s) Oral two times a day  insulin glargine Injectable (LANTUS) 20 Unit(s) SubCutaneous at bedtime  insulin lispro (ADMELOG) corrective regimen sliding scale   SubCutaneous every 4 hours  insulin lispro Injectable (ADMELOG) 5 Unit(s) SubCutaneous three times a day before meals  lactated ringers. 1000 milliLiter(s) (100 mL/Hr) IV Continuous <Continuous>  NIFEdipine XL 60 milliGRAM(s) Oral daily  potassium chloride  10 mEq/100 mL IVPB 10 milliEquivalent(s) IV Intermittent every 1 hour  vancomycin  IVPB 1500 milliGRAM(s) IV Intermittent every 12 hours    MEDICATIONS  (PRN):  dextrose Oral Gel 15 Gram(s) Oral once PRN Blood Glucose LESS THAN 70 milliGRAM(s)/deciliter  melatonin 3 milliGRAM(s) Oral at bedtime PRN Insomnia      CAPILLARY BLOOD GLUCOSE      POCT Blood Glucose.: 214 mg/dL (06 Jan 2025 05:41)  POCT Blood Glucose.: 218 mg/dL (06 Jan 2025 01:53)  POCT Blood Glucose.: 289 mg/dL (05 Jan 2025 21:04)  POCT Blood Glucose.: 277 mg/dL (05 Jan 2025 16:27)  POCT Blood Glucose.: 211 mg/dL (05 Jan 2025 11:26)  POCT Blood Glucose.: 226 mg/dL (05 Jan 2025 09:29)    I&O's Summary    05 Jan 2025 07:01  -  06 Jan 2025 07:00  --------------------------------------------------------  IN: 2740 mL / OUT: 2200 mL / NET: 540 mL        PHYSICAL EXAM:  Vital Signs Last 24 Hrs  T(C): 36.5 (06 Jan 2025 04:46), Max: 36.8 (05 Jan 2025 12:59)  T(F): 97.7 (06 Jan 2025 04:46), Max: 98.2 (05 Jan 2025 12:59)  HR: 60 (06 Jan 2025 04:46) (60 - 81)  BP: 176/93 (06 Jan 2025 04:46) (150/90 - 176/93)  BP(mean): --  RR: 18 (06 Jan 2025 04:46) (18 - 18)  SpO2: 99% (06 Jan 2025 04:46) (95% - 99%)    Parameters below as of 06 Jan 2025 04:46  Patient On (Oxygen Delivery Method): room air        CONSTITUTIONAL: NAD, well-developed  HEENT: Atraumatic, Normocephalic, EOMI, PERRL, Conjunctiva and sclera clear, moist mucous membranes  Neck: Supple, no elevated JVP.  RESPIRATORY: Normal respiratory effort; lungs are clear to auscultation bilaterally  CARDIOVASCULAR: Regular rate and rhythm, normal S1 and S2, no murmur/rub/gallop  ABDOMEN: Obese abdomen, nontender to palpation, normoactive bowel sounds, no rebound/guarding; No hepatosplenomegaly  MSK: no clubbing or cyanosis of digits; no joint swelling or tenderness to palpation; no lower extremity edema; Peripheral pulses are 2+ bilaterally  NEURO: AOx3, no focal deficits, slow speech  SKIN: No rashes or lesions    LABS:                        14.1   6.86  )-----------( 243      ( 06 Jan 2025 06:12 )             41.5     01-06    138  |  102  |  12  ----------------------------<  237[H]  2.9[LL]   |  22  |  0.78    Ca    8.9      06 Jan 2025 06:12  Phos  3.0     01-06  Mg     1.9     01-06    TPro  7.1  /  Alb  3.5  /  TBili  0.4  /  DBili  x   /  AST  18  /  ALT  19  /  AlkPhos  77  01-06          Urinalysis Basic - ( 06 Jan 2025 06:12 )    Color: x / Appearance: x / SG: x / pH: x  Gluc: 237 mg/dL / Ketone: x  / Bili: x / Urobili: x   Blood: x / Protein: x / Nitrite: x   Leuk Esterase: x / RBC: x / WBC x   Sq Epi: x / Non Sq Epi: x / Bacteria: x        Culture - Acid Fast - CSF (collected 03 Jan 2025 23:29)  Source: .CSF CSF    Culture - CSF with Gram Stain (collected 03 Jan 2025 23:29)  Source: .CSF CSF  Gram Stain (04 Jan 2025 03:31):    polymorphonuclear leukocytes seen    No organisms seen    by cytocentrifuge  Preliminary Report (04 Jan 2025 18:22):    No growth to date    Culture - Fungal, CSF (collected 03 Jan 2025 23:29)  Source: .CSF CSF  Preliminary Report (05 Jan 2025 10:24):    No growth    Culture - Blood (collected 03 Jan 2025 22:50)  Source: .Blood BLOOD  Preliminary Report (06 Jan 2025 04:01):    No growth at 48 Hours    Culture - Blood (collected 03 Jan 2025 22:45)  Source: .Blood BLOOD  Preliminary Report (06 Jan 2025 04:01):    No growth at 48 Hours        Tele Reviewed:    RADIOLOGY & ADDITIONAL TESTS:  Results Reviewed:   Imaging Personally Reviewed:  Electrocardiogram Personally Reviewed:

## 2025-01-06 NOTE — SWALLOW BEDSIDE ASSESSMENT ADULT - SWALLOW EVAL: DIAGNOSIS
Patient is a 64 year old male with pmhx of DM, HTN, transferred from [facility] for AMS and encephalitis. Patient now presents with an overtly functional oropharyngeal swallow. However, patient subjectively reporting globus sensation with solids which mildly improves with independent repeat swallows and liquid washes. No overt s/s of aspiration observed across trials. Objective testing warranted to further assess swallow mechanism.

## 2025-01-06 NOTE — SWALLOW BEDSIDE ASSESSMENT ADULT - H & P REVIEW
65 y/o male with h/o of HTN presents as a transfer from [facility]. Pt has been lethargic x 4 days and developed fever with worsening confusion x 2 days. *Patient is new to this service./yes 63 y/o male with h/o of HTN presents as a transfer from [facility]. Pt has been lethargic x 4 days and developed fever with worsening confusion x 2 days. In CTH at [facility], pt was reportedly found to have a SDH, the imaging on arrival to [facility] was repeated and this was not seen. Per radiology this may have been artifact as the follow up scan does not show this. s/p vancomycin and Zosyn prior to transfer and medications changed to CTX 2g, Vancomycin, and Acyclovir. Neurology and neurosurgery consulted; on neuro's exam, +nuchal rigidity and + brudzinski's sign. No focal neurologic deficits noted. LP was performed and csf studies show an elevated glucose, elevated proteins and TNC of 9 with neutrophilic predominance. ID consulted. Blood culture NGTD, pleural culture gram stain NGTD. MRI 1/5/25 mild subarachnoid/subdural blood, with reactive inflammation in the dura. 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 The course complicated by glucose over 500 - BHB 3.5, AG 23, and Bicarb 14. After insulin, improved to BHB 0/7, AG 14, bicarb 22, and gluc 226. Endo consulted and started on lantus 20 and mod SS. Given subdural/subarachnoid bleed on MRI and overnight hyperglycemic episode, etiology may be DM induced. Pt travelled to [facility] 3 months ago, no issues till 4 days prior to admission. Per EMR, Mental status improved but patient still mildly confused. **Patient is new to this service. Passed dysphagia screen 1/04, placed on regular diet. S&S consulted for "subjective dysphagia"/yes 63 y/o male with h/o of HTN presents as a transfer from [facility]. Pt has been lethargic x 4 days and developed fever with worsening confusion x 2 days. In CTH at [facility], pt was reportedly found to have a SDH, the imaging on arrival to [facility] was repeated and this was not seen. Per radiology this may have been artifact as the follow up scan does not show this. s/p vancomycin and Zosyn prior to transfer and medications changed to CTX 2g, Vancomycin, and Acyclovir. Neurology and neurosurgery consulted; on neuro's exam, +nuchal rigidity and + brudzinski's sign. No focal neurologic deficits noted. LP was performed and csf studies show an elevated glucose, elevated proteins and TNC of 9 with neutrophilic predominance. ID consulted. Blood culture NGTD, pleural culture gram stain NGTD. MRI 1/5/25 mild subarachnoid/subdural blood, with reactive inflammation in the dura. 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 The course complicated by glucose over 500 - BHB 3.5, AG 23, and Bicarb 14. After insulin, improved to BHB 0/7, AG 14, bicarb 22, and gluc 226. Endo consulted and started on lantus 20 and mod SS. Given subdural/subarachnoid bleed on MRI and overnight hyperglycemic episode, etiology may be DM induced. Pt travelled to [facility] 3 months ago, no issues till 4 days prior to admission. **Patient is new to this service. Passed dysphagia screen 1/04, placed on regular diet. S&S consulted for "subjective dysphagia"/yes

## 2025-01-06 NOTE — PROGRESS NOTE ADULT - PROBLEM SELECTOR PLAN 2
- glucose over 500s overnight of 1/4  - BHB 3.5 -> 0.7 after treatment  - AG 23  - A1c 11.8 1/4  - Endo consulted and appreciated recs    Plan:   - stat 10 units lantus 1/5.   - Increase nightly lantus to 20 units qhs   - Moderate correction scale lispro q6h.  - Started on atorvastatin 80mg - glucose over 500s overnight of 1/4  - BHB 3.5 -> 0.7 after treatment  - AG 23  - A1c 11.8 1/4  - Endo consulted and appreciated recs    Plan:   - stat 10 units lantus 1/5.   - Increase nightly lantus to 28 units qhs; premeal 8U  - low correction scale lispro q6h.  - Started on atorvastatin 80mg

## 2025-01-06 NOTE — PROGRESS NOTE ADULT - PROBLEM SELECTOR PLAN 5
Diet: regular, fall/asp precaution, s/s consult  DVT: scd as subdural on MRI  Dispo: PT/OT no skill needed

## 2025-01-07 ENCOUNTER — TRANSCRIPTION ENCOUNTER (OUTPATIENT)
Age: 65
End: 2025-01-07

## 2025-01-07 DIAGNOSIS — E27.9 DISORDER OF ADRENAL GLAND, UNSPECIFIED: ICD-10-CM

## 2025-01-07 DIAGNOSIS — R91.8 OTHER NONSPECIFIC ABNORMAL FINDING OF LUNG FIELD: ICD-10-CM

## 2025-01-07 DIAGNOSIS — E11.65 TYPE 2 DIABETES MELLITUS WITH HYPERGLYCEMIA: ICD-10-CM

## 2025-01-07 LAB
ANION GAP SERPL CALC-SCNC: 15 MMOL/L — SIGNIFICANT CHANGE UP (ref 5–17)
BUN SERPL-MCNC: 11 MG/DL — SIGNIFICANT CHANGE UP (ref 7–23)
CALCIUM SERPL-MCNC: 8.4 MG/DL — SIGNIFICANT CHANGE UP (ref 8.4–10.5)
CHLORIDE SERPL-SCNC: 99 MMOL/L — SIGNIFICANT CHANGE UP (ref 96–108)
CMV DNA CSF QL NAA+PROBE: SIGNIFICANT CHANGE UP IU/ML
CMV DNA SPEC NAA+PROBE-LOG#: SIGNIFICANT CHANGE UP LOG10IU/ML
CO2 SERPL-SCNC: 22 MMOL/L — SIGNIFICANT CHANGE UP (ref 22–31)
CREAT SERPL-MCNC: 0.91 MG/DL — SIGNIFICANT CHANGE UP (ref 0.5–1.3)
CULTURE RESULTS: SIGNIFICANT CHANGE UP
EBV DNA SERPL NAA+PROBE-ACNC: SIGNIFICANT CHANGE UP IU/ML
EBV PCR: SIGNIFICANT CHANGE UP IU/ML
EBVPCR LOG: SIGNIFICANT CHANGE UP LOG10IU/ML
EGFR: 95 ML/MIN/1.73M2 — SIGNIFICANT CHANGE UP
GLUCOSE BLDC GLUCOMTR-MCNC: 125 MG/DL — HIGH (ref 70–99)
GLUCOSE BLDC GLUCOMTR-MCNC: 225 MG/DL — HIGH (ref 70–99)
GLUCOSE BLDC GLUCOMTR-MCNC: 235 MG/DL — HIGH (ref 70–99)
GLUCOSE BLDC GLUCOMTR-MCNC: 245 MG/DL — HIGH (ref 70–99)
GLUCOSE BLDC GLUCOMTR-MCNC: 274 MG/DL — HIGH (ref 70–99)
GLUCOSE BLDC GLUCOMTR-MCNC: 322 MG/DL — HIGH (ref 70–99)
GLUCOSE SERPL-MCNC: 286 MG/DL — HIGH (ref 70–99)
HCT VFR BLD CALC: 39.7 % — SIGNIFICANT CHANGE UP (ref 39–50)
HGB BLD-MCNC: 13.3 G/DL — SIGNIFICANT CHANGE UP (ref 13–17)
HIV 1+2 AB+HIV1 P24 AG SERPL QL IA: SIGNIFICANT CHANGE UP
JCPYV DNA # CSF NAA+PROBE: SIGNIFICANT CHANGE UP COPIES/ML
MAGNESIUM SERPL-MCNC: 1.8 MG/DL — SIGNIFICANT CHANGE UP (ref 1.6–2.6)
MCHC RBC-ENTMCNC: 27.4 PG — SIGNIFICANT CHANGE UP (ref 27–34)
MCHC RBC-ENTMCNC: 33.5 G/DL — SIGNIFICANT CHANGE UP (ref 32–36)
MCV RBC AUTO: 81.7 FL — SIGNIFICANT CHANGE UP (ref 80–100)
MRSA PCR RESULT.: SIGNIFICANT CHANGE UP
NRBC # BLD: 0 /100 WBCS — SIGNIFICANT CHANGE UP (ref 0–0)
PHOSPHATE SERPL-MCNC: 3.3 MG/DL — SIGNIFICANT CHANGE UP (ref 2.5–4.5)
PLATELET # BLD AUTO: 239 K/UL — SIGNIFICANT CHANGE UP (ref 150–400)
POTASSIUM SERPL-MCNC: 2.8 MMOL/L — CRITICAL LOW (ref 3.5–5.3)
POTASSIUM SERPL-SCNC: 2.8 MMOL/L — CRITICAL LOW (ref 3.5–5.3)
RBC # BLD: 4.86 M/UL — SIGNIFICANT CHANGE UP (ref 4.2–5.8)
RBC # FLD: 14.4 % — SIGNIFICANT CHANGE UP (ref 10.3–14.5)
S AUREUS DNA NOSE QL NAA+PROBE: DETECTED
SODIUM SERPL-SCNC: 136 MMOL/L — SIGNIFICANT CHANGE UP (ref 135–145)
SPECIMEN SOURCE: SIGNIFICANT CHANGE UP
TSH SERPL-MCNC: 1.05 UIU/ML — SIGNIFICANT CHANGE UP (ref 0.27–4.2)
WBC # BLD: 6.31 K/UL — SIGNIFICANT CHANGE UP (ref 3.8–10.5)
WBC # FLD AUTO: 6.31 K/UL — SIGNIFICANT CHANGE UP (ref 3.8–10.5)

## 2025-01-07 PROCEDURE — 74230 X-RAY XM SWLNG FUNCJ C+: CPT | Mod: 26

## 2025-01-07 PROCEDURE — 99232 SBSQ HOSP IP/OBS MODERATE 35: CPT | Mod: GC

## 2025-01-07 PROCEDURE — G0545: CPT

## 2025-01-07 PROCEDURE — 99232 SBSQ HOSP IP/OBS MODERATE 35: CPT

## 2025-01-07 RX ORDER — INSULIN LISPRO 100/ML
VIAL (ML) SUBCUTANEOUS EVERY 4 HOURS
Refills: 0 | Status: DISCONTINUED | OUTPATIENT
Start: 2025-01-07 | End: 2025-01-07

## 2025-01-07 RX ORDER — INSULIN GLARGINE-YFGN 100 [IU]/ML
32 INJECTION, SOLUTION SUBCUTANEOUS AT BEDTIME
Refills: 0 | Status: DISCONTINUED | OUTPATIENT
Start: 2025-01-07 | End: 2025-01-07

## 2025-01-07 RX ORDER — INSULIN LISPRO 100/ML
VIAL (ML) SUBCUTANEOUS
Refills: 0 | Status: DISCONTINUED | OUTPATIENT
Start: 2025-01-07 | End: 2025-01-09

## 2025-01-07 RX ORDER — LISINOPRIL 30 MG/1
20 TABLET ORAL DAILY
Refills: 0 | Status: DISCONTINUED | OUTPATIENT
Start: 2025-01-08 | End: 2025-01-09

## 2025-01-07 RX ORDER — ACETAMINOPHEN 80 MG/.8ML
1000 SOLUTION/ DROPS ORAL ONCE
Refills: 0 | Status: COMPLETED | OUTPATIENT
Start: 2025-01-07 | End: 2025-01-07

## 2025-01-07 RX ORDER — INSULIN LISPRO 100/ML
11 VIAL (ML) SUBCUTANEOUS
Refills: 0 | Status: DISCONTINUED | OUTPATIENT
Start: 2025-01-07 | End: 2025-01-07

## 2025-01-07 RX ORDER — POTASSIUM CHLORIDE 600 MG/1
40 TABLET, FILM COATED, EXTENDED RELEASE ORAL ONCE
Refills: 0 | Status: COMPLETED | OUTPATIENT
Start: 2025-01-07 | End: 2025-01-07

## 2025-01-07 RX ORDER — ISOPROPYL ALCOHOL, BENZOCAINE .7; .06 ML/ML; ML/ML
0 SWAB TOPICAL
Qty: 100 | Refills: 1
Start: 2025-01-07

## 2025-01-07 RX ORDER — INSULIN GLARGINE-YFGN 100 [IU]/ML
30 INJECTION, SOLUTION SUBCUTANEOUS AT BEDTIME
Refills: 0 | Status: DISCONTINUED | OUTPATIENT
Start: 2025-01-07 | End: 2025-01-07

## 2025-01-07 RX ORDER — INSULIN LISPRO 100/ML
16 VIAL (ML) SUBCUTANEOUS
Refills: 0 | Status: DISCONTINUED | OUTPATIENT
Start: 2025-01-07 | End: 2025-01-08

## 2025-01-07 RX ORDER — INSULIN LISPRO 100/ML
13 VIAL (ML) SUBCUTANEOUS
Refills: 0 | Status: DISCONTINUED | OUTPATIENT
Start: 2025-01-07 | End: 2025-01-07

## 2025-01-07 RX ORDER — CEFTRIAXONE SODIUM 1 G/1
2000 INJECTION, POWDER, FOR SOLUTION INTRAMUSCULAR; INTRAVENOUS EVERY 12 HOURS
Refills: 0 | Status: DISCONTINUED | OUTPATIENT
Start: 2025-01-07 | End: 2025-01-09

## 2025-01-07 RX ORDER — INSULIN LISPRO 100/ML
VIAL (ML) SUBCUTANEOUS
Refills: 0 | Status: DISCONTINUED | OUTPATIENT
Start: 2025-01-07 | End: 2025-01-07

## 2025-01-07 RX ORDER — LISINOPRIL 30 MG/1
10 TABLET ORAL ONCE
Refills: 0 | Status: COMPLETED | OUTPATIENT
Start: 2025-01-07 | End: 2025-01-07

## 2025-01-07 RX ORDER — POTASSIUM CHLORIDE 600 MG/1
10 TABLET, FILM COATED, EXTENDED RELEASE ORAL
Refills: 0 | Status: COMPLETED | OUTPATIENT
Start: 2025-01-07 | End: 2025-01-07

## 2025-01-07 RX ORDER — INSULIN GLARGINE-YFGN 100 [IU]/ML
34 INJECTION, SOLUTION SUBCUTANEOUS AT BEDTIME
Refills: 0 | Status: DISCONTINUED | OUTPATIENT
Start: 2025-01-07 | End: 2025-01-09

## 2025-01-07 RX ORDER — INSULIN LISPRO 100/ML
VIAL (ML) SUBCUTANEOUS AT BEDTIME
Refills: 0 | Status: DISCONTINUED | OUTPATIENT
Start: 2025-01-07 | End: 2025-01-07

## 2025-01-07 RX ORDER — INSULIN LISPRO 100/ML
16 VIAL (ML) SUBCUTANEOUS
Refills: 0 | Status: DISCONTINUED | OUTPATIENT
Start: 2025-01-07 | End: 2025-01-07

## 2025-01-07 RX ORDER — ATORVASTATIN CALCIUM 40 MG/1
1 TABLET, FILM COATED ORAL
Qty: 30 | Refills: 1
Start: 2025-01-07 | End: 2025-03-07

## 2025-01-07 RX ADMIN — ACETAMINOPHEN 1000 MILLIGRAM(S): 80 SOLUTION/ DROPS ORAL at 02:17

## 2025-01-07 RX ADMIN — POTASSIUM CHLORIDE 100 MILLIEQUIVALENT(S): 600 TABLET, FILM COATED, EXTENDED RELEASE ORAL at 11:51

## 2025-01-07 RX ADMIN — LISINOPRIL 10 MILLIGRAM(S): 30 TABLET ORAL at 04:53

## 2025-01-07 RX ADMIN — POTASSIUM CHLORIDE 100 MILLIEQUIVALENT(S): 600 TABLET, FILM COATED, EXTENDED RELEASE ORAL at 10:48

## 2025-01-07 RX ADMIN — Medication 2: at 08:13

## 2025-01-07 RX ADMIN — POTASSIUM CHLORIDE 40 MILLIEQUIVALENT(S): 600 TABLET, FILM COATED, EXTENDED RELEASE ORAL at 08:14

## 2025-01-07 RX ADMIN — Medication 4: at 16:50

## 2025-01-07 RX ADMIN — NIFEDIPINE 60 MILLIGRAM(S): 60 TABLET, EXTENDED RELEASE ORAL at 04:53

## 2025-01-07 RX ADMIN — INSULIN GLARGINE-YFGN 34 UNIT(S): 100 INJECTION, SOLUTION SUBCUTANEOUS at 21:11

## 2025-01-07 RX ADMIN — Medication 2: at 04:52

## 2025-01-07 RX ADMIN — LISINOPRIL 10 MILLIGRAM(S): 30 TABLET ORAL at 15:43

## 2025-01-07 RX ADMIN — ACETAMINOPHEN 400 MILLIGRAM(S): 80 SOLUTION/ DROPS ORAL at 01:47

## 2025-01-07 RX ADMIN — ATORVASTATIN CALCIUM 80 MILLIGRAM(S): 40 TABLET, FILM COATED ORAL at 21:11

## 2025-01-07 RX ADMIN — Medication 8 UNIT(S): at 08:13

## 2025-01-07 RX ADMIN — CEFTRIAXONE SODIUM 100 MILLIGRAM(S): 1 INJECTION, POWDER, FOR SOLUTION INTRAMUSCULAR; INTRAVENOUS at 04:55

## 2025-01-07 RX ADMIN — POTASSIUM CHLORIDE 100 MILLIEQUIVALENT(S): 600 TABLET, FILM COATED, EXTENDED RELEASE ORAL at 08:15

## 2025-01-07 RX ADMIN — Medication 11 UNIT(S): at 11:52

## 2025-01-07 RX ADMIN — POTASSIUM CHLORIDE 40 MILLIEQUIVALENT(S): 600 TABLET, FILM COATED, EXTENDED RELEASE ORAL at 14:31

## 2025-01-07 RX ADMIN — Medication 8: at 11:51

## 2025-01-07 RX ADMIN — CEFTRIAXONE SODIUM 100 MILLIGRAM(S): 1 INJECTION, POWDER, FOR SOLUTION INTRAMUSCULAR; INTRAVENOUS at 16:49

## 2025-01-07 RX ADMIN — Medication 16 UNIT(S): at 17:36

## 2025-01-07 RX ADMIN — VANCOMYCIN HYDROCHLORIDE 250 MILLIGRAM(S): 5 INJECTION, POWDER, LYOPHILIZED, FOR SOLUTION INTRAVENOUS at 04:54

## 2025-01-07 NOTE — PROGRESS NOTE ADULT - PROBLEM SELECTOR PLAN 1
Concerning for meningitis vs subdural hemorrhage (seen on CT at [facility]) : fever, HA, +nuchal rigidity and a + brudzinski's sign on neurology's exam. s/p vancomycin and Zosyn prior to transfer   - LP was performed and csf studies show an glucose 258, elevated proteins 56 and TNC of 9 with neutrophilic predominance.   - Blood culture negative; CSF Culture gram stain NGTD  - MRI brain w wo contrast: mild subarachnoid/subdural blood, with reactive inflammation in the dura. 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  - MR c spine/neck with contrast: Mild degenerative changes of the cervical spine.   - WBC wnl 1/5/25, CPR wnl  - s/p acyclovir    Plan:  - c/w CTX 2g, Vancomycin    - EBV: past infection; Staph a PCR +, ESR elev 39    - Cryptoc, [place], HIV 1/2, MRSA, HSV 1/2 PCR, CRP, procal negative    - Pending borrelia, GENEVIEVE virus DNA, CMV    - Pt also with exudative lesion at the back of his throat, get throat swab  - ID consulted; appreciated recs  - CT CAP: No acute intra-thoracic, abdominal or pelvic pathology  - Neuro check q4  - Neurology/neurosurgery consulted  - Given subdural/subarachnoid bleed on MRI and overnight hyperglycemic episode, etiology may be DM induced,        - neurology: no acute intervention needed at this time

## 2025-01-07 NOTE — PROGRESS NOTE ADULT - PROBLEM SELECTOR PLAN 7
Diet: regular, fall/asp precaution, s/s consulted - [facility] study 1/7  DVT: scd as subdural on MRI  Dispo: PT/OT no skill needed

## 2025-01-07 NOTE — PROGRESS NOTE ADULT - PROBLEM SELECTOR PLAN 2
- glucose over 500s overnight of 1/4  - BHB 3.5 -> 0.7 after treatment  - AG 23  - A1c 11.8 1/4  - Endo consulted and appreciated recs    Plan:   - stat 10 units lantus 1/5.   - Increase nightly lantus to 28 units qhs; premeal 8U  - low correction scale lispro q6h.  - Started on atorvastatin 80mg

## 2025-01-07 NOTE — SWALLOW VFSS/MBS ASSESSMENT ADULT - H & P REVIEW
63 y/o male with h/o of HTN presents as a transfer from [facility]. Pt has been lethargic x 4 days and developed fever with worsening confusion x 2 days. In CTH at [facility], pt was reportedly found to have a SDH, the imaging on arrival to [facility] was repeated and this was not seen. Per radiology this may have been artifact as the follow up scan does not show this. s/p vancomycin and Zosyn prior to transfer and medications changed to CTX 2g, Vancomycin, and Acyclovir. Neurology and neurosurgery consulted; on neuro's exam, +nuchal rigidity and + brudzinski's sign. No focal neurologic deficits noted. LP was performed and csf studies show an elevated glucose, elevated proteins and TNC of 9 with neutrophilic predominance. ID consulted. Blood culture NGTD, pleural culture gram stain NGTD. MRI 1/5/25 mild subarachnoid/subdural blood, with reactive inflammation in the dura. 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 The course complicated by glucose over 500 - BHB 3.5, AG 23, and Bicarb 14. After insulin, improved to BHB 0/7, AG 14, bicarb 22, and gluc 226. Endo consulted and started on lantus 20 and mod SS. Given subdural/subarachnoid bleed on MRI and overnight hyperglycemic episode, etiology may be DM induced. Pt travelled to [facility] 3 months ago, no issues till 4 days prior to admission. **Patient is new to this service. Passed dysphagia screen 1/04, placed on regular diet. S&S consulted for "subjective dysphagia"/yes within normal limits

## 2025-01-07 NOTE — DISCHARGE NOTE PROVIDER - NSDCCPTREATMENT_GEN_ALL_CORE_FT
PRINCIPAL PROCEDURE  Procedure: MR brain  Findings and Treatment:   Multiple images are degraded by motion, but are nonetheless diagnostic.  There is a SWAN signal abnormality within a sulcus in the left parietal   lobe and at the interhemispheric fissure dorsally, measuring less than 2   mm in thickness. There is FLAIR hyperintensity within sulci in the left   temporal and parietal lobes. There is associated pachymeningeal   thickening and enhancement over both cerebral hemispheres, with   associated FLAIR hyperintensity. The appearance is suggestive of a small   amount of subarachnoid and subdural blood, with associated reactive   inflammation in the dura.  There is a small chronic infarct in the right cingulate gyrus and corpus   callosum.  Mild generalized cerebral volume loss. Mild nonspecific T2/FLAIR   hyperintensity in the periventricular and subcortical white matter.  No midline shift or herniation. No acute ischemia. Intracranial flow   voids are patent. The cerebellar tonsils are normally positioned.  Limited views of the sinuses and mastoids show mild mucosal thickening   without air-fluid levels, likely chronic.  Limited views of the orbits and visualized soft tissues of the neck,   face, scalp, skull base, and calvarium are otherwise unremarkable.  IMPRESSION:  1.  Mild subarachnoid/subdural blood, with reactive inflammation in the   dura.  2.  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        SECONDARY PROCEDURE  Procedure: CT head  Findings and Treatment: VENTRICLES AND SULCI: Normal in size and configuration.  INTRA-AXIAL: No mass effect or midline shift.  Scattered foci of white   matter hypoattenuation without associated mass effect, nonspecific,   likely chronic microvascular disease. Skull base vascular calcifications.  EXTRA-AXIAL: Previously seen thin parafalcine subdural hemorrhage   measuring up to 2 mm isnot visualized here. Mineralization along the   cerebral falx. Basal cisterns are normal in appearance.  VISUALIZED SINUSES:  Clear.  TYMPANOMASTOID CAVITIES:  Clear.  VISUALIZED ORBITS: Normal.  CALVARIUM: Intact.  MISCELLANEOUS: None.  CT HEAD:  Previously seen thin parafalcine subdural hemorrhage measuring up to 2 mm   is not visualized in this CT, and favored to reflect artifact on the   prior study.  No clear evidence of acute intracranial hemorrhage. No significant mass   effect or midline shift.      Procedure: MR neck  Findings and Treatment: FINDINGS:  Postoperative changes related to an ACDF of C6-C7. Susceptibility   artifact from the orthopedic hardware limits the evaluation at the   adjacent levels.  There is a cervical lordosis. Normal alignment of the cervical vertebrae.   The vertebral bodies have the appropriate height and MR signal. The   intervertebral discs have the appropriate height, however slightly   reduced T2 signal, compatible with disc desiccation.  Visualized portions of the posterior fossa appear within normal limits.   The medulla and cervical spinal cord have the appropriate caliber and MR   signal. No gross abnormal enhancement within the spinal canal.  C2-C3: No large disc bulge. The bilateral neuroforamina are patent. No   significant narrowing of the spinal canal.  C3-C4: No large disc bulge. The bilateral neuroforamina are patent. No   significant narrowing of the spinal canal.  C4-C5: No large disc bulge. The bilateral neuroforamina are patent. No   significant narrowing of the spinal canal.  C5-C6: No large disc bulge. The bilateral neuroforamina are patent. No   significant narrowing of the spinal canal.  C6-C7: Small disc osteophyte complex. Mild to moderate narrowing of the   bilateral neuroforamen. No significant narrowing of the spinal canal.  C7-T1: No large disc bulge. The bilateral neuroforamina are patent. No   significant narrowing of the spinal canal.  The paraspinal muscles are within normal limits. 1.2 x 0.9 cm nodule in   the right lobe of the thyroid with questionable peripheral enhancement.  IMPRESSION:  1.  No gross evidence of critical narrowing of the spinal canal or   abnormal cord signal.  2.  Mild degenerative changes of the cervical spine.  3.  Postoperative changes related to an ACDF of C6-C7.  4.  Nodule in the right lobe of the thyroid. Recommend nonemergent thyroid ultrasound for further characterization.    Procedure: CT chest, abdomen and pelvis  Findings and Treatment: CHEST:  LUNGS AND LARGE AIRWAYS: Patent central airways. Few sub-0.6 cm pulmonary   nodules. Right lowerlobe nodule (3-95), 0.5 cm. Left upper lobe nodule   (3-62), 0.3 cm.  PLEURA: No pleural effusion.  VESSELS: Mild coronary artery calcification.  HEART: Heart size is normal. No pericardial effusion.  MEDIASTINUM AND LUIS: No lymphadenopathy. Right thyroid lobe   hypoattenuating nodule, 1.3 cm.  CHEST WALL AND LOWER NECK: Mild bilateral symmetric gynecomastia.  ABDOMEN AND PELVIS:  LIVER: Steatosis.  BILE DUCTS: Normal caliber.  GALLBLADDER: Within normal limits.  SPLEEN: Within normal limits. Adjacent subcentimeter splenules.  PANCREAS: Within normal limits.  ADRENALS: Right adrenal nodule (3-162), 1.1 cm, indeterminate.  KIDNEYS/URETERS: Bilateral symmetric renal enhancement. No   hydronephrosis. Left renal cyst. Bilateral renal subcentimeter   hypoattenuating foci, too small to characterize.  BLADDER: Within normal limits.  REPRODUCTIVE ORGANS: Prostate is enlarged.  BOWEL: No bowel obstruction. Appendix is normal.  PERITONEUM/RETROPERITONEUM: Within normal limits.  VESSELS: Within normal limits.  LYMPH NODES: No lymphadenopathy.  ABDOMINAL WALL: Small fat-containing umbilical hernia.  BONES: Degenerative changes. Anterior cervical spinal fusion. Right   anterior 3rd rib small sclerotic focus, nonspecific. Right anterior 7th   rib small lucency, nonspecific.  IMPRESSION:  No acute intra-thoracic, abdominal or pelvic pathology.  Few sub-0.6 cm pulmonary nodules, largest 0.5 cm, nonspecific. In   patients with a history of smoking or other risk factors, follow-up CT   may be performed in one year.  Right adrenal nodule, 1.1 cm, indeterminate. Consider further evaluation with CT or MR adrenal protocol.    Procedure: CTA neck w/w/o contrast  Findings and Treatment: CTA NECK:  AORTIC ARCH AND VISUALIZED GREAT VESSELS: Within normal limits.  RIGHT:  COMMON CAROTID ARTERY: No significant stenosis to the carotid bifurcation.  INTERNAL CAROTID ARTERY: No significant stenosis based on NASCET criteria.  VERTEBRAL ARTERY:A small portion of the proximal segment is not   visualized secondary to streak artifact and cervical spine fixation   hardware. Visualized portions are normal in course and caliber to the   intracranial circulation.  LEFT:  COMMON CAROTID ARTERY: No significant stenosis to the carotid bifurcation.  INTERNAL CAROTID ARTERY: No significant stenosis based on NASCET criteria.  VERTEBRAL ARTERY: A small portion of the proximal segment is not   visualized secondary to streak artifact and cervical spine fixation   hardware. Visualized portions are normal in course and caliber to the   intracranial circulation.  VISUALIZED LUNGS: Clear.  MISCELLANEOUS: 1.3 cm right thyroid lobe hypodense nodule. C6-C7 anterior   spinal fusion hardware  CAROTID STENOSIS REFERENCE: Percent (%) stenosis is expressed in terms of   NASCET Criteria. (NASCET = 100x1-(N/D)). N=greatest narrowing. D=normal   distal diameter - MILD = <50% stenosis. - MODERATE = 50-69% stenosis. -   SEVERE = 70-89% stenosis. - HAIRLINE/CRITICAL = 90-99% stenosis. -   OCCLUDED = 100% stenosis.  IMPRESSION:  CTA NECK:  No evidence of significant stenosis or occlusion.      Procedure: CTA head w/w/o contrast  Findings and Treatment: CTA HEAD:  INTERNAL CAROTID ARTERIES: Bilateral petrous, precavernous, cavernous,   and supraclinoid regions are patent without significant stenosis.  [place] OF [place]: No aneurysm identified. Tiny aneurysms can be beyond   the resolution of CTA technique.  ANTERIOR CEREBRAL ARTERIES: No significant stenosis or occlusion.  MIDDLE CEREBRAL ARTERIES: No significant stenosis or occlusion.  POSTERIOR CEREBRAL ARTERIES: No significant stenosis or occlusion.  DISTAL VERTEBRAL / BASILAR ARTERIES: No occlusion. Linear filling defect   in the left V4 segment (3:322) likely artifactual.  Bilateral V4 segments demonstrate luminal irregularity and several foci   of at least moderatefocal luminal narrowing. Patent basilar artery.  VENOUS STRUCTURES: Visualized superficial and deep venous structures   appear patent.  MISCELLANEOUS: No other vascular abnormality is seen.  Impression:  CTA HEAD:  No proximal large vessel occlusion.

## 2025-01-07 NOTE — PROVIDER CONTACT NOTE (OTHER) - ACTION/TREATMENT ORDERED:
FS recheck 502. Provider Lissy Garcia notified; 10U Lantus and 10U Amelog ordered and given.
Provider made aware. Awaiting new sliding scale order. No interventions at this time.

## 2025-01-07 NOTE — PROGRESS NOTE ADULT - PROBLEM SELECTOR PLAN 3
- LDL goal <70   - C/w statin therapy or recommend statin therapy if not contraindicated   - Check lipid panel as outpatient on a yearly basis

## 2025-01-07 NOTE — SWALLOW VFSS/MBS ASSESSMENT ADULT - SLP GENERAL OBSERVATIONS
Pt arrived to radiology secured in DENTON chair, able to communicate needs and follow directions for exam. Pleasant and cooperative.

## 2025-01-07 NOTE — PROGRESS NOTE ADULT - PROBLEM SELECTOR PLAN 5
CTAP 1/6: Right adrenal nodule, 1.1 cm, indeterminate.    - Consider further evaluation with CT or MR adrenal protocol

## 2025-01-07 NOTE — DISCHARGE NOTE PROVIDER - CARE PROVIDER_API CALL
Gina Hernandez  NP in Family Health  [address], [address]  [place] [address]  Phone: [phone]  Fax: [phone]  Follow Up Time:

## 2025-01-07 NOTE — DISCHARGE NOTE PROVIDER - NSDCMRMEDTOKEN_GEN_ALL_CORE_FT
NIFEdipine 60 mg oral tablet, extended release: 1 tab(s) orally once a day   alcohol swabs: Apply topically to affected area 4 times a day  atorvastatin 80 mg oral tablet: 1 tab(s) orally once a day (at bedtime)  Freestyle Sanchez 3 [place]: Dispense 1 Cecil  Freestyle Sanhcez 3 Sensors: Please change every 14 days  Freestyle Precision Parker Strips: Test blood sugar four times a day when you see check blood glucose symbol or when symptoms don&#x27;t match Sanchez reading.  glucometer (per patient&#x27;s insurance): Test blood sugars four times a day. Dispense #1 glucometer.  Insulin Pen Needles, 4mm: 1 application subcutaneously 4 times a day. ** Use with insulin pen **  lancets: 1 application subcutaneously 4 times a day  NIFEdipine 60 mg oral tablet, extended release: 1 tab(s) orally once a day  test strips (per patient&#x27;s insurance): 1 application subcutaneously 4 times a day. ** Compatible with patient&#x27;s glucometer **   alcohol swabs: Apply topically to affected area 4 times a day  amoxicillin-clavulanate 875 mg-125 mg oral tablet: 875 milligram(s) orally 2 times a day Please take 1 tab twice a day from evening of 1/9/25 to morning of 1/11/25.  atorvastatin 80 mg oral tablet: 1 tab(s) orally once a day (at bedtime)  Freestyle Sanchez 3 [place]: Dispense 1 [place]  Freestyle Sanchez 3 Sensors: Please change every 14 days  glucometer (per patient&#x27;s insurance): Test blood sugars four times a day. Dispense #1 glucometer.  HumaLOG KwikPen 100 units/mL injectable solution: 10 unit(s) subcutaneous 3 times a day (before meals) 10 unit(s) subcutaneous 3 times a day (with meals) - do not give if skipping meals  Insulin Pen Needles, 4mm: 1 application subcutaneously 4 times a day. ** Use with insulin pen **  Jardiance 25 mg oral tablet: 1 tab(s) orally once a day  lancets: 1 application subcutaneously 4 times a day  Lantus Solostar Pen 100 units/mL subcutaneous solution: 34 unit(s) subcutaneous once a day (at bedtime) 34 unit(s) subcutaneous once a day  lisinopril 20 mg oral tablet: 1 tab(s) orally once a day  metFORMIN 500 mg oral tablet: 1 tab(s) orally 2 times a day  NIFEdipine 60 mg oral tablet, extended release: 1 tab(s) orally once a day  potassium chloride 20 mEq oral tablet, extended release: 2 tab(s) orally once a day Please take 2 tabs a day for the next 7 days and follow up with primary care doctor for repeat BMP (blood work)  spironolactone 25 mg oral tablet: 1 tab(s) orally once a day  test strips (per patient&#x27;s insurance): 1 application subcutaneously 4 times a day. ** Compatible with patient&#x27;s glucometer **   amoxicillin-clavulanate 875 mg-125 mg oral tablet: 875 milligram(s) orally 2 times a day Please take 1 tab twice a day from evening of 1/9/25 to morning of 1/11/25.  atorvastatin 80 mg oral tablet: 1 tab(s) orally once a day (at bedtime)  HumaLOG KwikPen 100 units/mL injectable solution: 10 unit(s) subcutaneous 3 times a day (before meals) 10 unit(s) subcutaneous 3 times a day (with meals) - do not give if skipping meals  Jardiance 25 mg oral tablet: 1 tab(s) orally once a day  Lantus Solostar Pen 100 units/mL subcutaneous solution: 34 unit(s) subcutaneous once a day (at bedtime) 34 unit(s) subcutaneous once a day  lisinopril 20 mg oral tablet: 1 tab(s) orally once a day  metFORMIN 500 mg oral tablet: 1 tab(s) orally 2 times a day  NIFEdipine 60 mg oral tablet, extended release: 1 tab(s) orally once a day  potassium chloride 20 mEq oral tablet, extended release: 2 tab(s) orally once a day Please take 2 tabs a day for the next 7 days and follow up with primary care doctor for repeat BMP (blood work)  spironolactone 25 mg oral tablet: 1 tab(s) orally once a day

## 2025-01-07 NOTE — PROGRESS NOTE ADULT - ASSESSMENT
63 y/o male with h/o of HTN  presents as a transfer from [facility] for fever and headache. In CTH at [facility], patient was reportedly found to have a SDH, the imaging or arrival to [facility] was repeated and this was not seen. Per radiology this may have been artifact as the follow up scan does not show this. s/p vancomycin and Zosyn prior to transfer and medications changed to CTX 2g, Vancomycin, and Acyclovir. Neurology and neurosurgery consulted; on neuro's exam, +nuchal rigidity and + brudzinski's sign.  No focal neurologic deficits noted. LP was performed and csf studies show an elevated glucose, elevated proteins and TNC of 9 with neutrophilic predominance. ID consulted. Blood culture NGTD, pleural culture gram stain NGTD. MRI 1/5/25 mild subarachnoid/subdural blood, with reactive inflammation in the dura. 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 The course complicated by glucose over 500 - BHB 3.5, AG 23, and Bicarb 14. After insulin, improved to BHB 0.7, AG 14, bicarb 22, and gluc 226. Endo consulted and started on lantus 20 and mod SS. Given subdural/subarachnoid bleed on MRI and overnight hyperglycemic episode, etiology may be DM induced.  ID consulted and recommended more infectious work up.

## 2025-01-07 NOTE — PROGRESS NOTE ADULT - ASSESSMENT
65 y/o male with pmhx of HTN, prior Anterior cervical discectomy and fusion w/ OSH Ortho 2023 presents as a transfer from [facility] for AMS. Patient has been lethargic x 4 days and developed fever with worsening confusion x 2 days.    Assessment:  #AMS, improving  #Fever resolved  #exudative pharyngitis       [facility] 11 and finger stick glucose up to 520. S/P Vancomycin and Zosyn prior to transfer to [facility].   -+nuchal rigidity and a + brudzinski's sign on neurology's exam (per note) , not present on our exam.   -LP showing elevated glucose, elevated proteins and TNC of 9 with neutrophilic predominance. CSF Cx neg, HSV neg   -BCX NGTD, RVP neg   -MRI brain w/wo contrast with mild subarachnoid/subdural blood, with reactive inflammation in the dura. 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  -Pt travelled to [facility] 3 months ago, no issues till 4 days prior to admission  -Pt currently on Vanc, Ceftriaxone and Acyclovir  -Pt with hx of prior Anterior cervical discectomy and fusion w/ OSH Ortho 2023        Recommendation:  -Cont Ceftriaxone while inpt    -MR c spine with no infection  -CT C/A/P with no infection either  -MRSA PCR, ESR, CRP, procal noted, normal procal.   - EBV serology, with past infection, HIV negative   -CMV, EBV PCR, thorat cx negative   -Cont to monitor fever and WBC curve   - can transition to augmentin 875 mg po bid for 7 days total until 1/10/25.       Plan discussed with Medicine    Will sign off, please call with questions.     Sabrina Shelby  Please contact through MS Teams   If no response or past 5 pm/weekend call [phone].

## 2025-01-07 NOTE — PROGRESS NOTE ADULT - SUBJECTIVE AND OBJECTIVE BOX
64yPatient is a 64y old  Male who presents with a chief complaint of fever, HA (07 Jan 2025 14:57)      Interval history:  Afebrile, feeling well, wants to now when he can go home.       Allergies:   Cipro (Unknown)      Antimicrobials:  cefTRIAXone   IVPB 2000 milliGRAM(s) IV Intermittent every 12 hours      REVIEW OF SYSTEMS:  No chest pain   No SOB  No abdominal pain  No dysuria   No rash.     Vital Signs Last 24 Hrs  T(C): 36.8 (01-07-25 @ 14:04), Max: 36.8 (01-07-25 @ 14:04)  T(F): 98.2 (01-07-25 @ 14:04), Max: 98.2 (01-07-25 @ 14:04)  HR: 81 (01-07-25 @ 14:04) (60 - 84)  BP: 161/97 (01-07-25 @ 14:04) (142/92 - 176/100)  BP(mean): --  RR: 18 (01-07-25 @ 14:04) (18 - 20)  SpO2: 98% (01-07-25 @ 14:04) (97% - 99%)      PHYSICAL EXAM:  Pt in no acute distress, alert, awake.   no neck stiffness  non distended abdomen  no edema LE   no phlebitis                           13.3   6.31  )-----------( 239      ( 07 Jan 2025 06:08 )             39.7   01-07    136  |  99  |  11  ----------------------------<  286[H]  2.8[LL]   |  22  |  0.91    Ca    8.4      07 Jan 2025 06:08  Phos  3.3     01-07  Mg     1.8     01-07    TPro  7.1  /  Alb  3.5  /  TBili  0.4  /  DBili  x   /  AST  18  /  ALT  19  /  AlkPhos  77  01-06      LIVER FUNCTIONS - ( 06 Jan 2025 06:12 )  Alb: 3.5 g/dL / Pro: 7.1 g/dL / ALK PHOS: 77 U/L / ALT: 19 U/L / AST: 18 U/L / GGT: x               Culture - Throat, Special (collected 05 Jan 2025 16:01)  Source: Throat Throat  Preliminary Report (07 Jan 2025 06:40):    No beta hemolytic streptococci or Arcanobacterium haemolyticum isolated.        Radiology:  < from: CT Abdomen and Pelvis w/ IV Cont (01.06.25 @ 20:36) >  IMPRESSION:  No acute intra-thoracic, abdominal or pelvic pathology.    Few sub-0.6 cm pulmonary nodules, largest 0.5 cm, nonspecific. In   patients with a history of smoking or other risk factors, follow-up CT   may be performed in one year.    Right adrenal nodule, 1.1 cm, indeterminate. Consider further evaluation   with CT or MR adrenal protocol.

## 2025-01-07 NOTE — DISCHARGE NOTE NURSING/CASE MANAGEMENT/SOCIAL WORK - NSDCFUADDAPPT_GEN_ALL_CORE_FT
APPTS ARE READY TO BE MADE: [X] YES    Best Family or Patient Contact (if needed): yes    Additional Information about above appointments (if needed):    1: PCP  2: Neurosurgery  3: Endocrine  4: ophthalmology  5: podiatry     Other comments or requests:

## 2025-01-07 NOTE — PROGRESS NOTE ADULT - ASSESSMENT
A/P: 64y Male with pmhx of HTN, prior Anterior cervical discectomy and fusion w/ OSH Ortho 2023 presents as a transfer from [facility] for AMS and encephalitis. Overnight, patient had extreme BG elevation to 600 and labs consistent with DKA, he was treated with IVF and subcutaneous insulin with resolution of ketoacidosis. Patient also found to have A1c 11.8%, Endocrine team consulted for uncontrolled diabetes. Patient is high risk with high level decision making due to uncontrolled diabetes which places patient at high risk for cardiovascular and cerebrovascular events. Patient with lability of glucose requiring close monitoring and insulin adjustments. Patient is doing well, has been tolerating POs and eating full meals. FBG elevated and continued postprandial hyperglycemia, will increase insulin regimen doses for tighter BG control. Patient will need RN to patient education with teach back regarding insulin pen injections/ fingersticking for BG checks  prior to d/c. Endocrine will closely monitor BG and adjust insulin as needed for BG goal 100-180mg/dL inpatient.       #Uncontrolled Type 2 diabetes mellitus new dx  A1C with Estimated Average Glucose Result: 11.8 % (01-05-25 @ 06:29)  A1C with Estimated Average Glucose Result: 11.8 % (01-04-25 @ 06:59)    Home regimen: none

## 2025-01-07 NOTE — SWALLOW VFSS/MBS ASSESSMENT ADULT - DIAGNOSTIC IMPRESSIONS
Pt is 63 y/o M admitted for fever, HA. Now presenting with a functional oropharyngeal swallow sequence. No laryngeal penetration or aspiration observed. No pharyngeal stasis noted.

## 2025-01-07 NOTE — DISCHARGE NOTE NURSING/CASE MANAGEMENT/SOCIAL WORK - FINANCIAL ASSISTANCE
[facility] provides services at a reduced cost to those who are determined to be eligible through [facility]’s financial assistance program. Information regarding [facility]’s financial assistance program can be found by going to [email or web address] or by calling [phone].

## 2025-01-07 NOTE — DISCHARGE NOTE PROVIDER - NSFOLLOWUPCLINICS_GEN_ALL_ED_FT
[facility]  Neurology  [address] [address]  Phone: [phone]  Fax:   Follow Up Time: 1 month    [facility]  Ophthalmology  [address], [address]  [place] [address]  Phone: [phone]  Fax:   Follow Up Time: 1 month    [facility]  Podiatry  [address] [address]  Phone: [phone]  Fax:   Follow Up Time: 1 month

## 2025-01-07 NOTE — PROVIDER CONTACT NOTE (OTHER) - BACKGROUND
Pt tx to [facility] for AMS; evaluation for encephalitis and SDH
subdural hematoma

## 2025-01-07 NOTE — PROGRESS NOTE ADULT - PROBLEM SELECTOR PLAN 1
Inpatient Plan:  - Check BG TID AC, HS, and 2AM while on PO diet  - Adjust Lantus to 32u QHS  - Adjust Admelog to 13u TID AC (HOLD if NPO)  - Adjust to moderate dose Admelog correctional scales TID AC, HS, and 2AM  - RN to provide patient education regarding insulin pen injections and BG checks with glucometer prior to discharge    Discharge Plan:  - Most likely d/c on basal/bolus (doses TBD) plus GLP1 if not contraindicated plus Jardiance vs Actos OR Basal insulin plus GLP1 plus Jardiance vs Actos -> TBD closer to d/c and pending clinical course.   - Patient will need home care upon discharge due to new need for home insulin therapy.   - Patient would benefit from CGM- please place order for CGM (Dexcom G7 or Freestyle Sanchez 3 whichever is covered by patient insurance). If patient cannot get tom on phone, please send for rx [place]ada x1 and Sensor x3.   - Patient should check BG TID AC and HS at home. Can use CGM to monitor BGs but if BG <100mg/dL or >250mg/dL, patient should confirm with fingerstick BG. Please tell patient to contact Endocrinologist if BG <70mg/dL x1 or >200mg/dL consistently or >400mg/dL x1.  - Endocrine follow up: can establish care at [address] [place] [address] Endocrinology ([phone])- please provide in d/c paperwork for patient to make tom. Will add to appt list closer to d/c.   - Recommend routine outpatient ophthalmology and podiatry follow up. Inpatient Plan:  - Check BG TID AC, HS, and 2AM while on PO diet  - Adjust Lantus to 34u QHS  - Adjust Admelog to 16u TID AC (HOLD if NPO)  - Adjust to moderate dose Admelog correctional scales TID AC, HS, and 2AM  - RN to provide patient education regarding insulin pen injections and BG checks with glucometer prior to discharge    Discharge Plan:  - Most likely d/c on basal/bolus (doses TBD) plus GLP1 if not contraindicated plus Jardiance vs Actos OR Basal insulin plus GLP1 plus Jardiance vs Actos -> TBD closer to d/c and pending clinical course.   - Patient will need home care upon discharge due to new need for home insulin therapy.   - Patient would benefit from CGM- please place order for CGM (Dexcom G7 or Freestyle Sanchez 3 whichever is covered by patient insurance). If patient cannot get tom on phone, please send for rx [place] x1 and Sensor x3.   - Patient should check BG TID AC and HS at home. Can use CGM to monitor BGs but if BG <100mg/dL or >250mg/dL, patient should confirm with fingerstick BG. Please tell patient to contact Endocrinologist if BG <70mg/dL x1 or >200mg/dL consistently or >400mg/dL x1.  - Endocrine follow up: can establish care at [address] [place] [address] Endocrinology ([phone])- please provide in d/c paperwork for patient to make tom. Will add to appt list closer to d/c.   - Recommend routine outpatient ophthalmology and podiatry follow up.

## 2025-01-07 NOTE — DISCHARGE NOTE NURSING/CASE MANAGEMENT/SOCIAL WORK - NSSCTYPOFSERV_GEN_ALL_CORE
HomeWILDAHA: Ten[facility] start of care 1/9; pending confirmation.  Please follow up with agency upon discharge.

## 2025-01-07 NOTE — DISCHARGE NOTE PROVIDER - NSDCFUADDAPPT_GEN_ALL_CORE_FT
APPTS ARE READY TO BE MADE: [X] YES    Best Family or Patient Contact (if needed): yes    Additional Information about above appointments (if needed):    1: PCP  2: Neurosurgery  3: Endocrine  4: ophthalmology  5: podiatry     Other comments or requests:    APPTS ARE READY TO BE MADE: [X] YES    Best Family or Patient Contact (if needed): yes    Additional Information about above appointments (if needed):    1: PCP  2: vascular neurology  3: Endocrine  4: ophthalmology  5: podiatry     Other comments or requests:    APPTS ARE READY TO BE MADE: [X] YES    Best Family or Patient Contact (if needed): yes    Additional Information about above appointments (if needed):    1: PCP within 1 week  2: vascular neurology  3: Endocrine - [place] location: [address] [facility], [place] [address] Phone: [phone].  4: ophthalmology - for DM  5: podiatry - for DM    Other comments or requests:    APPTS ARE READY TO BE MADE: [X] YES    Best Family or Patient Contact (if needed): yes    Additional Information about above appointments (if needed):    1: PCP within 1 week  2: vascular neurology  3: Endocrine - [place] location: [address] [facility], [place] [address] Phone: [phone].  4: ophthalmology - for DM  5: podiatry - for DM    Other comments or requests:   NP in [facility]:  Prior to outreaching the patient, it was visible that the patient has secured a follow up appointment which was not scheduled by our team..  DANIA NP,JULIUS MADRID 3/26/2025 2:00 PM    Neurology:  Appointment was scheduled in Mitesh YEBOAH MD,DONOVANCARMEN 5/2/2025 2:20 PM  [address] [facility], [address], [place] [id number] [phone]  ADDED TO WORKLIST FOR ASAP APPT    Ophthalmology:  Appointment was scheduled in Mitesh RUBIO MD,MARCIO 5/23/2025 2:45 PM  [address] [place] [address] [phone]  * TASK SENT REQUESTING SOONER APPT/ ADDED TO WORKLIST

## 2025-01-07 NOTE — PROGRESS NOTE ADULT - SUBJECTIVE AND OBJECTIVE BOX
Patient seen today for follow up inpatient Diabetes Mellitus management.    Chief Complaint: Type 2 Diabetes Mellitus, new dx    INTERVAL HX:  Patient seen in [facility] 468 W1. Patient is alert and oriented, sitting up in bed. Patient is doing well, reports eating full meals and tolerating POs well. FBG elevated to 245mg/dL this am. Noted continued hyperglycemia in the 200s postprandial and overnight. Severe hyperglycemia to 322mg/dL today at 1200. Patient denies eating any snacks or OSH food. Patient is on IV Ceftriaxone in D5% solution. No hypoglycemia. Blood glucose levels in the last 24hrs have been 245-322mg/dL.     Review of Systems:  General: As above.  Respiratory: Denies any SOB, RAMSEY, or cough.  Gastrointestinal: Denies any n/v/d or abdominal pain.   Endocrine: Denies any polyuria, polydipsia, polyphagia, visual changes, or numbness in feet.     Allergies  Cipro (Unknown)      Intolerances  None.       MEDICATIONS  (STANDING):  atorvastatin 80 milliGRAM(s) Oral at bedtime  cefTRIAXone   IVPB 2000 milliGRAM(s) IV Intermittent every 12 hours  dextrose 5%. 1000 milliLiter(s) IV Continuous <Continuous>  dextrose 5%. 1000 milliLiter(s) IV Continuous <Continuous>  dextrose 50% Injectable 25 Gram(s) IV Push once  dextrose 50% Injectable 12.5 Gram(s) IV Push once  dextrose 50% Injectable 25 Gram(s) IV Push once  dextrose Oral Gel 15 Gram(s) Oral once PRN  glucagon  Injectable 1 milliGRAM(s) IntraMuscular once  insulin glargine Injectable (LANTUS) 32 Unit(s) SubCutaneous at bedtime  insulin lispro (ADMELOG) corrective regimen sliding scale   SubCutaneous three times a day before meals  insulin lispro (ADMELOG) corrective regimen sliding scale   SubCutaneous <User Schedule>  insulin lispro Injectable (ADMELOG) 13 Unit(s) SubCutaneous three times a day before meals  lisinopril 10 milliGRAM(s) Oral daily  melatonin 3 milliGRAM(s) Oral at bedtime PRN  NIFEdipine XL 60 milliGRAM(s) Oral daily      atorvastatin 80 milliGRAM(s) Oral at bedtime  dextrose 50% Injectable 25 Gram(s) IV Push once  dextrose 50% Injectable 12.5 Gram(s) IV Push once  dextrose 50% Injectable 25 Gram(s) IV Push once  dextrose Oral Gel 15 Gram(s) Oral once PRN  glucagon  Injectable 1 milliGRAM(s) IntraMuscular once  insulin glargine Injectable (LANTUS) 32 Unit(s) SubCutaneous at bedtime  insulin lispro (ADMELOG) corrective regimen sliding scale   SubCutaneous three times a day before meals  insulin lispro (ADMELOG) corrective regimen sliding scale   SubCutaneous <User Schedule>  insulin lispro Injectable (ADMELOG) 13 Unit(s) SubCutaneous three times a day before meals      insulin lispro (ADMELOG) corrective regimen sliding scale   SubCutaneous three times a day before meals  insulin lispro (ADMELOG) corrective regimen sliding scale   SubCutaneous <User Schedule>  insulin lispro Injectable (ADMELOG) 13 Unit(s) SubCutaneous three times a day before meals      PHYSICAL EXAM:  VITALS:   T(C): 36.7 (01-07-25 @ 09:26), Max: 36.7 (01-07-25 @ 04:43)  HR: 77 (01-07-25 @ 09:26) (60 - 92)  BP: 152/96 (01-07-25 @ 09:26) (142/92 - 176/100)  RR: 20 (01-07-25 @ 09:26) (18 - 20)  SpO2: 98% (01-07-25 @ 09:26) (97% - 99%)    GENERAL: In no acute distress  Respiratory: Respirations unlabored  Extremities: Warm and dry, no edema  NEURO: Alert and oriented, appropriate     LABS:  POCT Blood Glucose.: 322 mg/dL (01-07-25 @ 11:33)  POCT Blood Glucose.: 245 mg/dL (01-07-25 @ 07:31)  POCT Blood Glucose.: 235 mg/dL (01-07-25 @ 04:20)  POCT Blood Glucose.: 274 mg/dL (01-06-25 @ 23:51)  POCT Blood Glucose.: 290 mg/dL (01-06-25 @ 20:49)  POCT Blood Glucose.: 264 mg/dL (01-06-25 @ 16:06)  POCT Blood Glucose.: 270 mg/dL (01-06-25 @ 11:27)  POCT Blood Glucose.: 214 mg/dL (01-06-25 @ 05:41)  POCT Blood Glucose.: 218 mg/dL (01-06-25 @ 01:53)  POCT Blood Glucose.: 289 mg/dL (01-05-25 @ 21:04)  POCT Blood Glucose.: 277 mg/dL (01-05-25 @ 16:27)  POCT Blood Glucose.: 211 mg/dL (01-05-25 @ 11:26)  POCT Blood Glucose.: 226 mg/dL (01-05-25 @ 09:29)  POCT Blood Glucose.: 300 mg/dL (01-05-25 @ 07:35)  POCT Blood Glucose.: 235 mg/dL (01-05-25 @ 06:10)  POCT Blood Glucose.: 367 mg/dL (01-05-25 @ 03:02)  POCT Blood Glucose.: 490 mg/dL (01-04-25 @ 21:45)  POCT Blood Glucose.: 504 mg/dL (01-04-25 @ 21:31)  POCT Blood Glucose.: 520 mg/dL (01-04-25 @ 21:28)                          13.3   6.31  )-----------( 239      ( 07 Jan 2025 06:08 )             39.7     01-07    136  |  99  |  11  ----------------------------<  286[H]  2.8[LL]   |  22  |  0.91    Ca    8.4      07 Jan 2025 06:08  Phos  3.3     01-07  Mg     1.8     01-07    TPro  7.1  /  Alb  3.5  /  TBili  0.4  /  DBili  x   /  AST  18  /  ALT  19  /  AlkPhos  77  01-06    LIVER FUNCTIONS - ( 06 Jan 2025 06:12 )  Alb: 3.5 g/dL / Pro: 7.1 g/dL / ALK PHOS: 77 U/L / ALT: 19 U/L / AST: 18 U/L / GGT: x               Urinalysis Basic - ( 07 Jan 2025 06:08 )    Color: x / Appearance: x / SG: x / pH: x  Gluc: 286 mg/dL / Ketone: x  / Bili: x / Urobili: x   Blood: x / Protein: x / Nitrite: x   Leuk Esterase: x / RBC: x / WBC x   Sq Epi: x / Non Sq Epi: x / Bacteria: x        Culture - Throat, Special (collected 05 Jan 2025 16:01)  Source: Throat Throat  Preliminary Report (07 Jan 2025 06:40):    No beta hemolytic streptococci or Arcanobacterium haemolyticum isolated.      A1C with Estimated Average Glucose Result: A1C with Estimated Average Glucose Result: 11.8 % (01-05-25 @ 06:29)  A1C with Estimated Average Glucose Result: 11.8 % (01-04-25 @ 06:59)

## 2025-01-07 NOTE — DISCHARGE NOTE PROVIDER - NSDCCPCAREPLAN_GEN_ALL_CORE_FT
PRINCIPAL DISCHARGE DIAGNOSIS  Diagnosis: Acute headache  Assessment and Plan of Treatment: You were evaluated for acute headache with fever. 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 As there was a concern for bleed in the brain, neurology and neurosurgery were consulted and based on MRI brain, there was no acute intervention needed at this time. Further extensive infectious workup were sent by infectious disease recommendation and returned negative for acute HIV, MRSA, CMV, EBV (past infection positive), blood culture, pleural culture, and thorat culture. However as precaution, you were continued on a full 7 day course of antibiotics, which was switched to oral medication: Augmentin 875mg twice a day until the morning of 1/11/25. Please take with meals to avoid stomach upset. It is crucial to complete the entire course, even if you start feeling better, to ensure the infection is fully treated.  Follow-Up Care:  - Schedule a follow-up appointment with your primary care provider within 1-2 weeks to ensure proper recovery and to address any ongoing symptoms or concerns.  - Schedule a follow-up appointment with vascular neurology to manage small bleed in the brain.  - Gradually return to normal activities as you feel able, but avoid strenuous activities until you have fully recovered.  - Ensure adequate rest and sleep to support your immune system and recovery.  - Maintain good hydration by drinking plenty of fluids, such as water, herbal teas, and clear broths.  Watch for any signs of worsening symptoms, such as increased difficulty breathing, persistent high fever, or chest pain, as well as any new symptoms, such as confusion or severe headache, and report these to your healthcare provider.      SECONDARY DISCHARGE DIAGNOSES  Diagnosis: Hypertension  Assessment and Plan of Treatment: As your pressure were still elevated while on home medication, Nifedipine 60mg daily, Lisinopril 20mg daily was added. Please pick it up at the [facility] pharmacy downstairs and take as instructed.    Diagnosis: Diabetes  Assessment and Plan of Treatment: You were found to have diabetes during admission - A1c of 11.8. Endocrinology was consulted and recommended following insulin regimen.   - Lantus 34 uints at bed time  - Lispro 16 units before each meals (do not take it if skipping meals)  Following supplies were also sent for insulin.  - Lanset  - glucometer  - test strips  - alcohol swab  - insulin pen  - Freestyle Sanchez 3 sensor  - Freestyle Sanchez 3 reader  - Freestyle Sanchez 3 test strips  Please follow up with endocrine at [address] [place] [address] [address] [place] [address] Endocrinology ([phone]). Please call to make an appointment within 2 weeks.    Diagnosis: Lung nodules  Assessment and Plan of Treatment: On the imaging, there was incidental finding of sub-0.6cm pulmonary lung nodules. At this time, no acute intervention is needed. Please make sure to take the result and inform your primary care doctor regarding this finding for further management. Given your work environment, please follow-up with a CT chest scan in one year.    Diagnosis: Thyroid nodule  Assessment and Plan of Treatment: On the imaging, there was incidental finding of 1.2 x 0.9 cm nodule in   the right lobe of the thyroid. Your thyroid stimulating level was within normal limit. At this time, no acute intervention is needed. Please make sure to take the result and inform your primary care doctor regarding this finding for further management.    Diagnosis: Adrenal nodule  Assessment and Plan of Treatment: On the imaging, there was incidental finding of 1.1 cm right adrenal nodule. As you mention you have a following appointment at [facility] regarding this, please make sure to take the result and inform the doctor regarding this finding for further management.    Diagnosis: Subdural hemorrhage  Assessment and Plan of Treatment: See above    Diagnosis: Hyperlipidemia  Assessment and Plan of Treatment: You were started on Atorvastatin 80mg during admission to prevent stroke.     PRINCIPAL DISCHARGE DIAGNOSIS  Diagnosis: Acute headache  Assessment and Plan of Treatment: You were evaluated for acute headache with fever. 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 As there was a concern for bleed in the brain, neurology and neurosurgery were consulted and based on MRI brain, there was no acute intervention needed at this time. Further extensive infectious workup were sent by infectious disease recommendation and returned negative for acute HIV, MRSA, CMV, EBV (past infection positive), blood culture, pleural culture, and thorat culture. However as precaution, you were continued on a full 7 day course of antibiotics, which was switched to oral medication: Augmentin 875mg twice a day from this evening to the morning of 1/11/25. Please take with meals to avoid stomach upset. It is crucial to complete the entire course, even if you start feeling better, to ensure the infection is fully treated.  Follow-Up Care:  - Schedule a follow-up appointment with your primary care provider within 1 weeks to REPEAT blood work (BMP) for potassium level check.   - Schedule a follow-up appointment with vascular neurology to manage small bleed in the brain.  - Gradually return to normal activities as you feel able, but avoid strenuous activities until you have fully recovered.  - Ensure adequate rest and sleep to support your immune system and recovery.  - Maintain good hydration by drinking plenty of fluids, such as water, herbal teas, and clear broths.  Watch for any signs of worsening symptoms, such as increased difficulty breathing, persistent high fever, or chest pain, as well as any new symptoms, such as confusion or severe headache, and report these to your healthcare provider.      SECONDARY DISCHARGE DIAGNOSES  Diagnosis: Subdural hemorrhage  Assessment and Plan of Treatment: See above    Diagnosis: Hypertension  Assessment and Plan of Treatment: As your pressure were still elevated while on home medication, Nifedipine 60mg daily, Lisinopril 20mg daily and spironolactone 25mg daily were added.   Please pick it up at the [facility] pharmacy downstairs and take as instructed.    Diagnosis: Diabetes  Assessment and Plan of Treatment: You were found to have diabetes during admission - A1c of 11.8. Endocrinology was consulted and recommended following insulin regimen.   Insulin:  - Lantus 34 uints at bed time  - Lispro 10 units before each meals (do not take it if skipping meals)  Other oral meds:  - Metformin 500mg twice a day  - Jaurdiance 25mg daily  You already received following supplies:  - Lanset  - glucometer  - test strips  - alcohol swab  - insulin pen  - Freestyle Sanchez 3 sensor/reader  - Freestyle Sanchez 3 test strips  You will be established endocrinology care at [address] [place] [address], [place] [address] Phone: [phone]. Please call this number to make an appointment after discharge within 2 weeks for further management.   Please follow up as above for sugar level management, thyroid and adrenal nodule finidngs.    Diagnosis: Hyperlipidemia  Assessment and Plan of Treatment: You were started on Atorvastatin 80mg during admission to prevent stroke. Please continue taking this medication daily at bedtime.    Diagnosis: Lung nodules  Assessment and Plan of Treatment: On the imaging, there was incidental finding of sub-0.6cm pulmonary lung nodules. At this time, no acute intervention is needed. Please make sure to take the result and inform your primary care doctor regarding this finding for further management. Given your work environment, please follow-up with a CT chest scan in one year.    Diagnosis: Adrenal nodule  Assessment and Plan of Treatment: On the imaging, there was incidental finding of 1.1 cm right adrenal nodule. As you mention you have a following appointment at [facility] regarding this, please make sure to take the result and inform the doctor regarding this finding for further management.    Diagnosis: Thyroid nodule  Assessment and Plan of Treatment: On the imaging, there was incidental finding of 1.2 x 0.9 cm nodule in   the right lobe of the thyroid. Your thyroid stimulating level was within normal limit. At this time, no acute intervention is needed. Please make sure to take the result and inform your primary care doctor/endocrinology regarding this finding for further management.    Diagnosis: Medication dose changed  Assessment and Plan of Treatment: Summary of Medications changes:  Antibiotics:  - Augmentin 875mg twice a day from 1/9 evening to the morning of 1/11/25  Hypertension:  - Nifedipine 60mg daily  - Lisinopril 20mg daily  - Spironolactone 25mg sual  Diabetes:  - Long acting insulin 34 uints at bed time  - Short acting insulin 10 units before each meals (do not take it if skipping meals)  - Metformin 500mg twice a day  - Jaurdiance 25mg daily  Hypokalemia:  - Potassium supplement 40mg (2 tabs of 20mg) daily for next 7 days.    Diagnosis: Hypokalemia  Assessment and Plan of Treatment: Your potassium level was low during admission. You were started on Potassium supplement 40mg (2 tabs of 20mg) daily for next 7 days. Please make sure to follow up with your doctor within 1 week for repeat potassium level check (BMP).

## 2025-01-07 NOTE — PROGRESS NOTE ADULT - PROBLEM SELECTOR PLAN 6
- CTAP 1/6: Right adrenal nodule, 1.1 cm, indeterminate. Consider further evaluation with CT or MR adrenal protocol.

## 2025-01-07 NOTE — DISCHARGE NOTE PROVIDER - HOSPITAL COURSE
For full details, please see H&P, progress notes, consult notes and ancillary notes.      Hospital Course:  63 y/o male with h/o of HTN  presents as a transfer from [facility] for fever and headache. In CTH at [facility], patient was reportedly found to have a SDH, the imaging or arrival to [facility] was repeated and this was not seen. Per radiology this may have been artifact as the follow up scan does not show this. s/p vancomycin and Zosyn prior to transfer and medications changed to CTX 2g, Vancomycin, and Acyclovir. Neurology and neurosurgery consulted; on neuro's exam, +nuchal rigidity and + brudzinski's sign.  No focal neurologic deficits noted. LP was performed and csf studies show an elevated glucose, elevated proteins and TNC of 9 with neutrophilic predominance. ID consulted and recommended extensive infectious work up were sent, which showed past EBV infection and positive staph a PCR.  Blood culture NGTD, pleural culture gram stain NGTD. WBC normalized after day 1 of abx. MRI 1/5/25 mild subarachnoid/subdural blood, with reactive inflammation in the dura. 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 The course complicated by glucose over 500 - BHB 3.5, AG 23, and Bicarb 14. After insulin, improved to BHB 0.7, AG 14, bicarb 22, and gluc 226. Endo consulted and started on lantus and premeal with mod SS. Patient clinically improved gradually and stable for dc with close follow up and Augmentin 875mg BID to finish a total 7 days (1/4 PM to 1/11 AM).    On day of discharge, patient is clinically stable with no new exam findings or acute symptoms compared to prior. The patient was seen by the attending physician on the date of discharge and deemed stable and acceptable for discharge. The patient's chronic medical conditions were treated accordingly per the patient's home medication regimen. The patient's medication reconciliation (with changes made to chronic medications), follow up appointments, discharge orders, instructions, and significant lab and diagnostic studies are as noted.     Discharge follow up action items:     1. Follow up with PCP,   2. Follow up labs: n/a  3. Medication changes: Augmentin 875mg BID to finish a total 7 days (1/4 PM to 1/11 AM), Glargine, lispro, Lisinopril 20mg bid   4. On hold medications: n/a  5. Incidental findings: thyroid nodule, lung nodules, adrenal nodules    Patient's ordered code status: full  Patient disposition: home    Patient will be discharged to home with close follow up. For full details, please see H&P, progress notes, consult notes and ancillary notes.      Hospital Course:  65 y/o male with h/o of HTN  presents as a transfer from [facility] for fever and headache. In CTH at [facility], patient was reportedly found to have a SDH, the imaging or arrival to [facility] was repeated and this was not seen. Per radiology this may have been artifact as the follow up scan does not show this. s/p vancomycin and Zosyn prior to transfer and medications changed to CTX 2g, Vancomycin, and Acyclovir. Neurology and neurosurgery consulted; on neuro's exam, +nuchal rigidity and + brudzinski's sign.  No focal neurologic deficits noted. LP was performed and csf studies show an elevated glucose, elevated proteins and TNC of 9 with neutrophilic predominance. ID consulted and recommended extensive infectious work up were sent, which showed past EBV infection and positive staph a PCR.  Blood culture NGTD, pleural culture gram stain NGTD. WBC normalized after day 1 of abx. MRI 1/5/25 mild subarachnoid/subdural blood, with reactive inflammation in the dura. 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 The course complicated by glucose over 500 - BHB 3.5, AG 23, and Bicarb 14. After insulin, improved to BHB 0.7, AG 14, bicarb 22, and gluc 226. Endo consulted and started on lantus and premeal with mod SS. Patient clinically improved gradually and stable for dc with close follow up and Augmentin 875mg BID to finish a total 7 days (1/4 PM to 1/11 AM).    On day of discharge, patient is clinically stable with no new exam findings or acute symptoms compared to prior. The patient was seen by the attending physician on the date of discharge and deemed stable and acceptable for discharge. The patient's chronic medical conditions were treated accordingly per the patient's home medication regimen. The patient's medication reconciliation (with changes made to chronic medications), follow up appointments, discharge orders, instructions, and significant lab and diagnostic studies are as noted.     Discharge follow up action items:     1. Follow up with PCP,  vascular neurology,   2. Follow up labs: n/a  3. Medication changes: Augmentin 875mg BID to finish a total 7 days (1/4 PM to 1/11 AM), Glargine, lispro, Lisinopril 20mg bid   4. On hold medications: n/a  5. Incidental findings: thyroid nodule, lung nodules, adrenal nodules    Patient's ordered code status: full  Patient disposition: home    Patient will be discharged to home with close follow up. For full details, please see H&P, progress notes, consult notes and ancillary notes.      Hospital Course:  63 y/o male with h/o of HTN  presents as a transfer from [facility] for fever and headache. In CTH at [facility], patient was reportedly found to have a SDH, the imaging or arrival to [facility] was repeated and this was not seen. Per radiology this may have been artifact as the follow up scan does not show this. s/p vancomycin and Zosyn prior to transfer and medications changed to CTX 2g, Vancomycin, and Acyclovir. Neurology and neurosurgery consulted; on neuro's exam, +nuchal rigidity and + brudzinski's sign.  No focal neurologic deficits noted. LP was performed and csf studies show an elevated glucose, elevated proteins and TNC of 9 with neutrophilic predominance. ID consulted and recommended extensive infectious work up were sent, which showed past EBV infection and positive staph a PCR.  Blood culture NGTD, pleural culture gram stain NGTD. WBC normalized after day 1 of abx. MRI 1/5/25 mild subarachnoid/subdural blood, with reactive inflammation in the dura. 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 The course complicated by glucose over 500 - BHB 3.5, AG 23, and Bicarb 14. After insulin, improved to BHB 0.7, AG 14, bicarb 22, and gluc 226. Endo consulted and started on lantus and premeal with mod SS - will be discharged with 34 U lantus, 10U short acting, metformin 500mg BID, and Jaurdiance 25mg daily. Patient was persistently hypokalemic - improved with repletion - will discharged with 40mg PO potassium daily for 7 days. Patient clinically improved gradually and stable for dc with close follow up and Augmentin 875mg BID to finish a total 7 days (1/4 PM to 1/11 AM).    On day of discharge, patient is clinically stable with no new exam findings or acute symptoms compared to prior. The patient was seen by the attending physician on the date of discharge and deemed stable and acceptable for discharge. The patient's chronic medical conditions were treated accordingly per the patient's home medication regimen. The patient's medication reconciliation (with changes made to chronic medications), follow up appointments, discharge orders, instructions, and significant lab and diagnostic studies are as noted.     Discharge follow up action items:     1. Follow up with PCP,  vascular neurology,   2. Follow up labs: n/a  3. Medication changes: Augmentin 875mg BID to finish a total 7 days (1/4 PM to 1/11 AM), Glargine 34U, lispro 10U, metformin 500mg BID, Jaurdiance 25mg, Lisinopril 20mg bid, and potassium 40mg, spironolactone 25mg.  4. On hold medications: n/a  5. Incidental findings: thyroid nodule, lung nodules, adrenal nodules    Patient's ordered code status: full  Patient disposition: home    Patient will be discharged to home with close follow up. HPI/Hospital Course:  63 y/o male with only known medical history of HTN presented as a transfer from [facility] for AMS, fever, and headache. On CTH at [facility], patient was reportedly found to have SDH, the imaging or arrival to [facility] was repeated but was not seen. Per radiology this may have been artifact. Patient was started on empiric antibiotics with vancomycin and zosyn prior to transfer for sepsis. On arrival, he was started on CTX 2g, vancomycin, and acyclovir. Neurology and neurosurgery were consulted. Neurology noted +nuchal rigidity and brudzinski's sign without focal neurologic deficits. LP was performed and csf studies showed an elevated glucose, elevated proteins and TNC of 9 with neutrophilic predominance. ID was consulted and extensive infectious work up were sent, which showed past EBV infection and positive staph a PCR. Blood culture and CSF culture/gram stain returned negative. WBC normalized with antibiotics. MRI was obtained given reported SDH and AMS, which showed mild subarachnoid/subdural blood, with reactive inflammation in the dura. 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 Neurology recommended no further intervention for these findings and patient's neurologic exam remained stable. Patient initially endorsed expressive aphasia which improved within 1-3 days of admission. Course was complicated by DKA with BHB 3.5, AG 23, and Bicarb 14. Endocrine was consulted and DKA resolved with SQ insulin (did not require insulin gtt or ICU admission). Patient received insulin teaching and will be discharged on lantus 34U, admelog 10U tidac, metformin 500mg BID, and jardiance 25mg daily. Freestyle Sanchez was provided to the patient along with education. Patient was also persistently hypokalemic requiring aggressive repletion. He will be discharged with 40mg PO potassium daily for 7 days with plans to repeat BMP in 1 week for monitoring. Patient had uncontrolled HTN and was started on lisinopril (iso DM) and spironolactone (iso hypoK) in addition to home nifedipine. Patient clinically improved gradually and stable for dc with close follow up and augmentin 875mg BID to finish a total 7 days (1/4 PM to 1/11 AM).    Patient was found to have incidental thyroid, lung, and adrenal nodules on imaging. He will require follow up with PCP for continued monitoring outpatient.    On day of discharge, patient is clinically stable with no new exam findings or acute symptoms compared to prior. The patient was seen by the attending physician on the date of discharge and deemed stable and acceptable for discharge. The patient's chronic medical conditions were treated accordingly per the patient's home medication regimen. The patient's medication reconciliation (with changes made to chronic medications), follow up appointments, discharge orders, instructions, and significant lab and diagnostic studies are as noted.     Discharge follow up action items:     1. Follow up with PCP, endocrine, and vascular neurology  2. Follow up labs: BMP in 1 week for hypokalemia  3. Medication changes: Augmentin 875mg BID to finish a total 7 days (1/4 PM to 1/11 AM), Glargine 34U, lispro 10U, metformin 500mg BID, Jaurdiance 25mg, Lisinopril 20mg bid, and potassium 40mg, spironolactone 25mg.  4. On hold medications: n/a  5. Incidental findings: thyroid nodule, lung nodules, adrenal nodules    Patient's ordered code status: full  Patient disposition: home    Patient will be discharged to home with close follow up.

## 2025-01-07 NOTE — DISCHARGE NOTE NURSING/CASE MANAGEMENT/SOCIAL WORK - PATIENT PORTAL LINK FT
You can access the FollowMyHealth Patient Portal offered by [facility] by registering at the following website: [email or web address]. By joining [facility]’s FollowMyHealth portal, you will also be able to view your health information using other applications (apps) compatible with our system.

## 2025-01-07 NOTE — DISCHARGE NOTE PROVIDER - NSDCFUSCHEDAPPT_GEN_ALL_CORE_FT
Gina Hernandez  [facility]  [address]  Scheduled Appointment: 03/26/2025     [facility]  PODIATRY [address]  Scheduled Appointment: 03/05/2025    Gina Hernandez  [address]  Scheduled Appointment: 03/26/2025

## 2025-01-07 NOTE — PROGRESS NOTE ADULT - SUBJECTIVE AND OBJECTIVE BOX
PROGRESS NOTE:   Authored by Dr. Kathleen Zimmerman MD (PGY-1).  via TEAMS    Patient is a 64y old  Male who presents with a chief complaint of fever, HA (06 Jan 2025 14:34)      SUBJECTIVE / OVERNIGHT EVENTS:  No acute events overnight.     ADDITIONAL REVIEW OF SYSTEMS:  Patient denies fevers, chills, chest pain, shortness of breath, nausea, abdominal pain, diarrhea, constipation, dysuria, leg swelling, headache, light headedness.    MEDICATIONS  (STANDING):  atorvastatin 80 milliGRAM(s) Oral at bedtime  cefTRIAXone   IVPB 2000 milliGRAM(s) IV Intermittent every 12 hours  dextrose 5%. 1000 milliLiter(s) (50 mL/Hr) IV Continuous <Continuous>  dextrose 5%. 1000 milliLiter(s) (100 mL/Hr) IV Continuous <Continuous>  dextrose 50% Injectable 25 Gram(s) IV Push once  dextrose 50% Injectable 12.5 Gram(s) IV Push once  dextrose 50% Injectable 25 Gram(s) IV Push once  glucagon  Injectable 1 milliGRAM(s) IntraMuscular once  insulin lispro (ADMELOG) corrective regimen sliding scale   SubCutaneous every 4 hours  insulin lispro (ADMELOG) corrective regimen sliding scale   SubCutaneous three times a day before meals  insulin lispro (ADMELOG) corrective regimen sliding scale   SubCutaneous at bedtime  insulin lispro Injectable (ADMELOG) 8 Unit(s) SubCutaneous three times a day before meals  lisinopril 10 milliGRAM(s) Oral daily  NIFEdipine XL 60 milliGRAM(s) Oral daily  potassium chloride    Tablet ER 40 milliEquivalent(s) Oral once  potassium chloride  10 mEq/100 mL IVPB 10 milliEquivalent(s) IV Intermittent every 1 hour  vancomycin  IVPB 1750 milliGRAM(s) IV Intermittent every 12 hours    MEDICATIONS  (PRN):  dextrose Oral Gel 15 Gram(s) Oral once PRN Blood Glucose LESS THAN 70 milliGRAM(s)/deciliter  melatonin 3 milliGRAM(s) Oral at bedtime PRN Insomnia      CAPILLARY BLOOD GLUCOSE      POCT Blood Glucose.: 235 mg/dL (07 Jan 2025 04:20)  POCT Blood Glucose.: 274 mg/dL (06 Jan 2025 23:51)  POCT Blood Glucose.: 290 mg/dL (06 Jan 2025 20:49)  POCT Blood Glucose.: 264 mg/dL (06 Jan 2025 16:06)  POCT Blood Glucose.: 270 mg/dL (06 Jan 2025 11:27)    I&O's Summary    06 Jan 2025 07:01  -  07 Jan 2025 07:00  --------------------------------------------------------  IN: 420 mL / OUT: 0 mL / NET: 420 mL        PHYSICAL EXAM:  Vital Signs Last 24 Hrs  T(C): 36.7 (07 Jan 2025 04:43), Max: 36.7 (06 Jan 2025 09:19)  T(F): 98 (07 Jan 2025 04:43), Max: 98 (06 Jan 2025 09:19)  HR: 61 (07 Jan 2025 04:43) (60 - 92)  BP: 142/92 (07 Jan 2025 04:43) (142/92 - 176/100)  BP(mean): --  RR: 18 (07 Jan 2025 04:43) (18 - 18)  SpO2: 98% (07 Jan 2025 04:43) (96% - 99%)    Parameters below as of 07 Jan 2025 04:43  Patient On (Oxygen Delivery Method): room air        CONSTITUTIONAL: NAD  HEENT: Atraumatic, Normocephalic, EOMI, PERRL, Conjunctiva and sclera clear, moist mucous membranes  Neck: Supple, no elevated JVP.  RESPIRATORY: Normal respiratory effort; lungs are clear to auscultation bilaterally  CARDIOVASCULAR: Regular rate and rhythm, normal S1 and S2, no murmur/rub/gallop  ABDOMEN: Nontender to palpation, normoactive bowel sounds, no rebound/guarding; No hepatosplenomegaly  MSK: no clubbing or cyanosis of digits; no joint swelling or tenderness to palpation; no lower extremity edema; Peripheral pulses are 2+ bilaterally  NEURO: AOx3, no focal deficits. slow speech  SKIN: No rashes or lesions    LABS:                        13.3   6.31  )-----------( 239      ( 07 Jan 2025 06:08 )             39.7     01-07    136  |  99  |  11  ----------------------------<  286[H]  2.8[LL]   |  22  |  0.91    Ca    8.4      07 Jan 2025 06:08  Phos  3.3     01-07  Mg     1.8     01-07    TPro  7.1  /  Alb  3.5  /  TBili  0.4  /  DBili  x   /  AST  18  /  ALT  19  /  AlkPhos  77  01-06          Urinalysis Basic - ( 07 Jan 2025 06:08 )    Color: x / Appearance: x / SG: x / pH: x  Gluc: 286 mg/dL / Ketone: x  / Bili: x / Urobili: x   Blood: x / Protein: x / Nitrite: x   Leuk Esterase: x / RBC: x / WBC x   Sq Epi: x / Non Sq Epi: x / Bacteria: x        Culture - Throat, Special (collected 05 Jan 2025 16:01)  Source: Throat Throat  Preliminary Report (07 Jan 2025 06:40):    No beta hemolytic streptococci or Arcanobacterium haemolyticum isolated.        Tele Reviewed:    RADIOLOGY & ADDITIONAL TESTS:  Results Reviewed:   Imaging Personally Reviewed:  Electrocardiogram Personally Reviewed:

## 2025-01-07 NOTE — PROVIDER CONTACT NOTE (OTHER) - ASSESSMENT
No neurological changes. VSS. Patient denying symptoms of hyperglycemia.
Pt neurologically stable, AOx4. Aphasic.

## 2025-01-07 NOTE — PROGRESS NOTE ADULT - PROBLEM SELECTOR PLAN 5
- CT chest 1/6: Few sub-0.6 cm pulmonary nodules, largest 0.5 cm, nonspecific. In patients with a history of smoking or other risk factors, follow-up CT may be performed in one year.

## 2025-01-08 DIAGNOSIS — E87.6 HYPOKALEMIA: ICD-10-CM

## 2025-01-08 LAB
ALBUMIN SERPL ELPH-MCNC: 3.7 G/DL — SIGNIFICANT CHANGE UP (ref 3.3–5)
ALP SERPL-CCNC: 73 U/L — SIGNIFICANT CHANGE UP (ref 40–120)
ALT FLD-CCNC: 26 U/L — SIGNIFICANT CHANGE UP (ref 10–45)
ANION GAP SERPL CALC-SCNC: 13 MMOL/L — SIGNIFICANT CHANGE UP (ref 5–17)
ANION GAP SERPL CALC-SCNC: 15 MMOL/L — SIGNIFICANT CHANGE UP (ref 5–17)
APPEARANCE UR: CLEAR — SIGNIFICANT CHANGE UP
AST SERPL-CCNC: 23 U/L — SIGNIFICANT CHANGE UP (ref 10–40)
B BURGDOR DNA SPEC QL NAA+PROBE: NEGATIVE — SIGNIFICANT CHANGE UP
BACTERIA # UR AUTO: NEGATIVE /HPF — SIGNIFICANT CHANGE UP
BASOPHILS # BLD AUTO: 0.03 K/UL — SIGNIFICANT CHANGE UP (ref 0–0.2)
BASOPHILS NFR BLD AUTO: 0.5 % — SIGNIFICANT CHANGE UP (ref 0–2)
BILIRUB SERPL-MCNC: 0.4 MG/DL — SIGNIFICANT CHANGE UP (ref 0.2–1.2)
BILIRUB UR-MCNC: NEGATIVE — SIGNIFICANT CHANGE UP
BUN SERPL-MCNC: 11 MG/DL — SIGNIFICANT CHANGE UP (ref 7–23)
BUN SERPL-MCNC: 12 MG/DL — SIGNIFICANT CHANGE UP (ref 7–23)
CALCIUM SERPL-MCNC: 8.7 MG/DL — SIGNIFICANT CHANGE UP (ref 8.4–10.5)
CALCIUM SERPL-MCNC: 9.2 MG/DL — SIGNIFICANT CHANGE UP (ref 8.4–10.5)
CAST: 1 /LPF — SIGNIFICANT CHANGE UP (ref 0–4)
CHLORIDE SERPL-SCNC: 101 MMOL/L — SIGNIFICANT CHANGE UP (ref 96–108)
CHLORIDE SERPL-SCNC: 99 MMOL/L — SIGNIFICANT CHANGE UP (ref 96–108)
CHOLEST SERPL-MCNC: 206 MG/DL — HIGH
CO2 SERPL-SCNC: 23 MMOL/L — SIGNIFICANT CHANGE UP (ref 22–31)
CO2 SERPL-SCNC: 23 MMOL/L — SIGNIFICANT CHANGE UP (ref 22–31)
COLOR SPEC: YELLOW — SIGNIFICANT CHANGE UP
CREAT SERPL-MCNC: 0.93 MG/DL — SIGNIFICANT CHANGE UP (ref 0.5–1.3)
CREAT SERPL-MCNC: 1.04 MG/DL — SIGNIFICANT CHANGE UP (ref 0.5–1.3)
CULTURE RESULTS: SIGNIFICANT CHANGE UP
DIFF PNL FLD: NEGATIVE — SIGNIFICANT CHANGE UP
EGFR: 81 ML/MIN/1.73M2 — SIGNIFICANT CHANGE UP
EGFR: 93 ML/MIN/1.73M2 — SIGNIFICANT CHANGE UP
EOSINOPHIL # BLD AUTO: 0.12 K/UL — SIGNIFICANT CHANGE UP (ref 0–0.5)
EOSINOPHIL NFR BLD AUTO: 2 % — SIGNIFICANT CHANGE UP (ref 0–6)
GLUCOSE BLDC GLUCOMTR-MCNC: 154 MG/DL — HIGH (ref 70–99)
GLUCOSE BLDC GLUCOMTR-MCNC: 168 MG/DL — HIGH (ref 70–99)
GLUCOSE BLDC GLUCOMTR-MCNC: 170 MG/DL — HIGH (ref 70–99)
GLUCOSE BLDC GLUCOMTR-MCNC: 177 MG/DL — HIGH (ref 70–99)
GLUCOSE BLDC GLUCOMTR-MCNC: 209 MG/DL — HIGH (ref 70–99)
GLUCOSE BLDC GLUCOMTR-MCNC: 83 MG/DL — SIGNIFICANT CHANGE UP (ref 70–99)
GLUCOSE SERPL-MCNC: 189 MG/DL — HIGH (ref 70–99)
GLUCOSE SERPL-MCNC: 268 MG/DL — HIGH (ref 70–99)
GLUCOSE UR QL: 500 MG/DL
HCT VFR BLD CALC: 41.7 % — SIGNIFICANT CHANGE UP (ref 39–50)
HDLC SERPL-MCNC: 43 MG/DL — SIGNIFICANT CHANGE UP
HGB BLD-MCNC: 14.1 G/DL — SIGNIFICANT CHANGE UP (ref 13–17)
IMM GRANULOCYTES NFR BLD AUTO: 0.3 % — SIGNIFICANT CHANGE UP (ref 0–0.9)
KETONES UR-MCNC: NEGATIVE MG/DL — SIGNIFICANT CHANGE UP
LEUKOCYTE ESTERASE UR-ACNC: NEGATIVE — SIGNIFICANT CHANGE UP
LIPID PNL WITH DIRECT LDL SERPL: 149 MG/DL — HIGH
LYMPHOCYTES # BLD AUTO: 1.78 K/UL — SIGNIFICANT CHANGE UP (ref 1–3.3)
LYMPHOCYTES # BLD AUTO: 29.1 % — SIGNIFICANT CHANGE UP (ref 13–44)
MAGNESIUM SERPL-MCNC: 1.9 MG/DL — SIGNIFICANT CHANGE UP (ref 1.6–2.6)
MCHC RBC-ENTMCNC: 28.2 PG — SIGNIFICANT CHANGE UP (ref 27–34)
MCHC RBC-ENTMCNC: 33.8 G/DL — SIGNIFICANT CHANGE UP (ref 32–36)
MCV RBC AUTO: 83.4 FL — SIGNIFICANT CHANGE UP (ref 80–100)
MONOCYTES # BLD AUTO: 0.59 K/UL — SIGNIFICANT CHANGE UP (ref 0–0.9)
MONOCYTES NFR BLD AUTO: 9.6 % — SIGNIFICANT CHANGE UP (ref 2–14)
NEUTROPHILS # BLD AUTO: 3.58 K/UL — SIGNIFICANT CHANGE UP (ref 1.8–7.4)
NEUTROPHILS NFR BLD AUTO: 58.5 % — SIGNIFICANT CHANGE UP (ref 43–77)
NITRITE UR-MCNC: NEGATIVE — SIGNIFICANT CHANGE UP
NON HDL CHOLESTEROL: 164 MG/DL — HIGH
NRBC # BLD: 0 /100 WBCS — SIGNIFICANT CHANGE UP (ref 0–0)
PH UR: 7.5 — SIGNIFICANT CHANGE UP (ref 5–8)
PHOSPHATE SERPL-MCNC: 3.5 MG/DL — SIGNIFICANT CHANGE UP (ref 2.5–4.5)
PLATELET # BLD AUTO: 277 K/UL — SIGNIFICANT CHANGE UP (ref 150–400)
POTASSIUM SERPL-MCNC: 2.9 MMOL/L — CRITICAL LOW (ref 3.5–5.3)
POTASSIUM SERPL-MCNC: 3.3 MMOL/L — LOW (ref 3.5–5.3)
POTASSIUM SERPL-SCNC: 2.9 MMOL/L — CRITICAL LOW (ref 3.5–5.3)
POTASSIUM SERPL-SCNC: 3.3 MMOL/L — LOW (ref 3.5–5.3)
POTASSIUM UR-SCNC: 43 MMOL/L — SIGNIFICANT CHANGE UP
PROT SERPL-MCNC: 7 G/DL — SIGNIFICANT CHANGE UP (ref 6–8.3)
PROT UR-MCNC: SIGNIFICANT CHANGE UP MG/DL
RBC # BLD: 5 M/UL — SIGNIFICANT CHANGE UP (ref 4.2–5.8)
RBC # FLD: 14.2 % — SIGNIFICANT CHANGE UP (ref 10.3–14.5)
RBC CASTS # UR COMP ASSIST: 1 /HPF — SIGNIFICANT CHANGE UP (ref 0–4)
SODIUM SERPL-SCNC: 135 MMOL/L — SIGNIFICANT CHANGE UP (ref 135–145)
SODIUM SERPL-SCNC: 139 MMOL/L — SIGNIFICANT CHANGE UP (ref 135–145)
SP GR SPEC: 1.01 — SIGNIFICANT CHANGE UP (ref 1–1.03)
SPECIMEN SOURCE: SIGNIFICANT CHANGE UP
SQUAMOUS # UR AUTO: 0 /HPF — SIGNIFICANT CHANGE UP (ref 0–5)
TRIGL SERPL-MCNC: 79 MG/DL — SIGNIFICANT CHANGE UP
UROBILINOGEN FLD QL: 0.2 MG/DL — SIGNIFICANT CHANGE UP (ref 0.2–1)
WBC # BLD: 6.12 K/UL — SIGNIFICANT CHANGE UP (ref 3.8–10.5)
WBC # FLD AUTO: 6.12 K/UL — SIGNIFICANT CHANGE UP (ref 3.8–10.5)
WBC UR QL: 1 /HPF — SIGNIFICANT CHANGE UP (ref 0–5)

## 2025-01-08 PROCEDURE — 99232 SBSQ HOSP IP/OBS MODERATE 35: CPT | Mod: GC

## 2025-01-08 PROCEDURE — 99233 SBSQ HOSP IP/OBS HIGH 50: CPT

## 2025-01-08 RX ORDER — INSULIN LISPRO 100/ML
10 VIAL (ML) SUBCUTANEOUS
Refills: 0 | Status: DISCONTINUED | OUTPATIENT
Start: 2025-01-08 | End: 2025-01-09

## 2025-01-08 RX ORDER — POTASSIUM CHLORIDE 600 MG/1
40 TABLET, FILM COATED, EXTENDED RELEASE ORAL EVERY 4 HOURS
Refills: 0 | Status: DISCONTINUED | OUTPATIENT
Start: 2025-01-08 | End: 2025-01-09

## 2025-01-08 RX ORDER — MAGNESIUM SULFATE 500 MG/ML
2 INJECTION, SOLUTION INTRAMUSCULAR; INTRAVENOUS ONCE
Refills: 0 | Status: COMPLETED | OUTPATIENT
Start: 2025-01-08 | End: 2025-01-08

## 2025-01-08 RX ORDER — BUTALB/ACETAMINOPHEN/CAFFEINE 50-325-40
1 TABLET ORAL ONCE
Refills: 0 | Status: COMPLETED | OUTPATIENT
Start: 2025-01-08 | End: 2025-01-08

## 2025-01-08 RX ORDER — POTASSIUM CHLORIDE 600 MG/1
10 TABLET, FILM COATED, EXTENDED RELEASE ORAL
Refills: 0 | Status: COMPLETED | OUTPATIENT
Start: 2025-01-08 | End: 2025-01-08

## 2025-01-08 RX ORDER — ACETAMINOPHEN 80 MG/.8ML
1000 SOLUTION/ DROPS ORAL ONCE
Refills: 0 | Status: COMPLETED | OUTPATIENT
Start: 2025-01-08 | End: 2025-01-08

## 2025-01-08 RX ORDER — POTASSIUM CHLORIDE 600 MG/1
40 TABLET, FILM COATED, EXTENDED RELEASE ORAL ONCE
Refills: 0 | Status: COMPLETED | OUTPATIENT
Start: 2025-01-08 | End: 2025-01-08

## 2025-01-08 RX ORDER — SPIRONOLACTONE 50 MG/1
25 TABLET ORAL DAILY
Refills: 0 | Status: DISCONTINUED | OUTPATIENT
Start: 2025-01-08 | End: 2025-01-08

## 2025-01-08 RX ORDER — SPIRONOLACTONE 50 MG/1
25 TABLET ORAL DAILY
Refills: 0 | Status: DISCONTINUED | OUTPATIENT
Start: 2025-01-09 | End: 2025-01-09

## 2025-01-08 RX ORDER — SPIRONOLACTONE 50 MG/1
25 TABLET ORAL ONCE
Refills: 0 | Status: COMPLETED | OUTPATIENT
Start: 2025-01-08 | End: 2025-01-08

## 2025-01-08 RX ORDER — INSULIN LISPRO 100/ML
14 VIAL (ML) SUBCUTANEOUS
Refills: 0 | Status: DISCONTINUED | OUTPATIENT
Start: 2025-01-08 | End: 2025-01-08

## 2025-01-08 RX ADMIN — Medication 2: at 08:27

## 2025-01-08 RX ADMIN — POTASSIUM CHLORIDE 100 MILLIEQUIVALENT(S): 600 TABLET, FILM COATED, EXTENDED RELEASE ORAL at 13:16

## 2025-01-08 RX ADMIN — ACETAMINOPHEN 400 MILLIGRAM(S): 80 SOLUTION/ DROPS ORAL at 00:09

## 2025-01-08 RX ADMIN — ACETAMINOPHEN 1000 MILLIGRAM(S): 80 SOLUTION/ DROPS ORAL at 00:41

## 2025-01-08 RX ADMIN — INSULIN GLARGINE-YFGN 34 UNIT(S): 100 INJECTION, SOLUTION SUBCUTANEOUS at 21:30

## 2025-01-08 RX ADMIN — LISINOPRIL 20 MILLIGRAM(S): 30 TABLET ORAL at 05:29

## 2025-01-08 RX ADMIN — CEFTRIAXONE SODIUM 100 MILLIGRAM(S): 1 INJECTION, POWDER, FOR SOLUTION INTRAMUSCULAR; INTRAVENOUS at 18:54

## 2025-01-08 RX ADMIN — SPIRONOLACTONE 25 MILLIGRAM(S): 50 TABLET ORAL at 18:49

## 2025-01-08 RX ADMIN — POTASSIUM CHLORIDE 40 MILLIEQUIVALENT(S): 600 TABLET, FILM COATED, EXTENDED RELEASE ORAL at 16:49

## 2025-01-08 RX ADMIN — POTASSIUM CHLORIDE 100 MILLIEQUIVALENT(S): 600 TABLET, FILM COATED, EXTENDED RELEASE ORAL at 11:05

## 2025-01-08 RX ADMIN — POTASSIUM CHLORIDE 40 MILLIEQUIVALENT(S): 600 TABLET, FILM COATED, EXTENDED RELEASE ORAL at 09:03

## 2025-01-08 RX ADMIN — Medication 16 UNIT(S): at 08:27

## 2025-01-08 RX ADMIN — CEFTRIAXONE SODIUM 100 MILLIGRAM(S): 1 INJECTION, POWDER, FOR SOLUTION INTRAMUSCULAR; INTRAVENOUS at 05:30

## 2025-01-08 RX ADMIN — NIFEDIPINE 60 MILLIGRAM(S): 60 TABLET, EXTENDED RELEASE ORAL at 05:29

## 2025-01-08 RX ADMIN — Medication 2: at 16:47

## 2025-01-08 RX ADMIN — Medication 1 TABLET(S): at 16:49

## 2025-01-08 RX ADMIN — POTASSIUM CHLORIDE 100 MILLIEQUIVALENT(S): 600 TABLET, FILM COATED, EXTENDED RELEASE ORAL at 09:03

## 2025-01-08 RX ADMIN — MAGNESIUM SULFATE 25 GRAM(S): 500 INJECTION, SOLUTION INTRAMUSCULAR; INTRAVENOUS at 15:30

## 2025-01-08 RX ADMIN — ATORVASTATIN CALCIUM 80 MILLIGRAM(S): 40 TABLET, FILM COATED ORAL at 21:30

## 2025-01-08 RX ADMIN — Medication 10 UNIT(S): at 16:47

## 2025-01-08 NOTE — ADVANCED PRACTICE NURSE CONSULT - ASSESSMENT
Reviewed with patient the Freestyle Libre3 plus and how to use. Sanchez placed on left arm. Patient shown how to use the Sanchez tom.  Educated patient on the importance of self monitoring blood glucose when he feels the number on the sanchez does not match symptoms. Reviewed with patient the signs and symptoms of hypoglycemia and how to treat. Patient able to teachback. Pt educated on insulin pen administration, hypoglycemia and treatment, diet, exercise, and blood glucose monitoring, pt  with good return demo and verbalized understanding. pt to follow up with endocrine as directed. pt given educational material on carbohydrate meal plan, food label, and my plate.

## 2025-01-08 NOTE — PROVIDER CONTACT NOTE (CRITICAL VALUE NOTIFICATION) - ACTION/TREATMENT ORDERED:
Provider notified. No interventions @ this time.
k ordered and to be given.
Provider and primary RN notified. Pt was treated with amelog and lantus
Multiple attempts to contact night provider Monico Jones with no reply. Will pass on to day shift nurse.
Provider contacted and ordered K+ supplement. Passed on to day nurse, Brittaney Rios RN.

## 2025-01-08 NOTE — PROGRESS NOTE ADULT - PROBLEM SELECTOR PLAN 1
Concerning for meningitis vs subdural hemorrhage (seen on CT at [facility]) : fever, HA, +nuchal rigidity and a + brudzinski's sign on neurology's exam. s/p vancomycin and Zosyn prior to transfer   - LP was performed and csf studies show an glucose 258, elevated proteins 56 and TNC of 9 with neutrophilic predominance.   - Blood culture negative; CSF Culture gram stain NGTD  - MRI brain w wo contrast: mild subarachnoid/subdural blood, with reactive inflammation in the dura. 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  - MR c spine/neck with contrast: Mild degenerative changes of the cervical spine.   - WBC wnl 1/5/25, CPR wnl  - s/p acyclovir    Plan:  - c/w CTX 2g, Vancomycin    - EBV: past infection; Staph a PCR +, ESR elev 39    - Cryptoc, [place], HIV 1/2, MRSA, HSV 1/2 PCR, CRP, procal negative    - Pending borrelia, GENEVIEVE virus DNA, CMV    - Pt also with exudative lesion at the back of his throat, get throat swab  - ID consulted; appreciated recs  - CT CAP: No acute intra-thoracic, abdominal or pelvic pathology  - Neuro check q4  - Neurology/neurosurgery consulted  - Given subdural/subarachnoid bleed on MRI and overnight hyperglycemic episode, etiology may be DM induced,        - neurology: no acute intervention needed at this time. Follow up outpatient.

## 2025-01-08 NOTE — PROGRESS NOTE ADULT - PROBLEM SELECTOR PLAN 5
CTAP 1/6: Right adrenal nodule, 1.1 cm, indeterminate.    - Consider further evaluation with CT or MR adrenal protocol outpatient

## 2025-01-08 NOTE — PROVIDER CONTACT NOTE (CRITICAL VALUE NOTIFICATION) - BACKGROUND
subdural hematoma
Pt admitted for SDH vs encephalitis.
Admitted to [facility] for AMS
ams, htn, dm
NEFTALY Schreiber from [facility] for evaluation for encephalitis and SDH.

## 2025-01-08 NOTE — PROGRESS NOTE ADULT - PROBLEM SELECTOR PLAN 7
Diet: regular, fall/asp precaution, s/s consulted - MBS study negative 1/7  DVT: scd as subdural on MRI  Dispo: PT/OT no skill needed - CTAP 1/6: Right adrenal nodule, 1.1 cm, indeterminate. Consider further evaluation with CT or MR adrenal protocol.  - Has a referral from PCP  - f/u outpt

## 2025-01-08 NOTE — PROGRESS NOTE ADULT - PROBLEM SELECTOR PLAN 3
- c/w home med: nifedipine 60mg daily  - Start Lisinopril 10mg ->20mg daily - glucose over 500s overnight of 1/4  - BHB 3.5 -> 0.7 after treatment  - AG 23  - A1c 11.8 1/4  - Endo consulted and appreciated recs    Plan:   - stat 10 units lantus 1/5.   - Increase nightly lantus to 34 units qhs; premeal 14U  - low correction scale lispro q6h.  - Started on atorvastatin 80mg  - Follow outpatient

## 2025-01-08 NOTE — PROGRESS NOTE ADULT - NUTRITIONAL ASSESSMENT
Diet, Consistent Carbohydrate/No Snacks (01-05-25 @ 10:31) [Active]
Diet, Consistent Carbohydrate/No Snacks (01-05-25 @ 10:31) [Active]

## 2025-01-08 NOTE — PROGRESS NOTE ADULT - PROBLEM SELECTOR PLAN 5
- CT chest 1/6: Few sub-0.6 cm pulmonary nodules, largest 0.5 cm, nonspecific. In patients with a history of smoking or other risk factors, follow-up CT may be performed in one year. - MR neck: nodule in the right lobe of the thyroid.  - f/u outpatient

## 2025-01-08 NOTE — PROGRESS NOTE ADULT - PROBLEM SELECTOR PLAN 4
- MR neck: nodule in the right lobe of the thyroid.  - f/u outpatient - c/w home med: nifedipine 60mg daily  - Start Lisinopril 10mg ->20mg daily

## 2025-01-08 NOTE — PROGRESS NOTE ADULT - NSPROGADDITIONALINFOA_GEN_ALL_CORE
Contact via [employer] Teams during business hours  To reach covering provider access AMION via sunrise tools  For Urgent matters/after-hours/weekends/holidays please page endocrine fellow on call   For nonurgent matters please email [email or web address]    Please note that this patient may be followed by different provider tomorrow.  Notify endocrine 24 hours prior to discharge for final recommendations
Provided face to face education which was more than 50% of encounter that also included assessing patient/labs/meds and discussing plan of care with patient/primary care team.     Adjusting insulin  Discharge plan   Follow up care     Contact via Microsoft Teams during business hours  To reach covering provider access AMION via sunrise tools  For Urgent matters/after-hours/weekends/holidays please page endocrine fellow on call   For nonurgent matters please email [email or web address]    Please note that this patient may be followed by different provider tomorrow.  Notify endocrine 24 hours prior to discharge for final recommendations

## 2025-01-08 NOTE — PROGRESS NOTE ADULT - PROBLEM SELECTOR PLAN 6
- CTAP 1/6: Right adrenal nodule, 1.1 cm, indeterminate. Consider further evaluation with CT or MR adrenal protocol.  - Has a referral from PCP  - f/u outpt - CT chest 1/6: Few sub-0.6 cm pulmonary nodules, largest 0.5 cm, nonspecific. In patients with a history of smoking or other risk factors, follow-up CT may be performed in one year.

## 2025-01-08 NOTE — PROGRESS NOTE ADULT - PROBLEM SELECTOR PLAN 2
- glucose over 500s overnight of 1/4  - BHB 3.5 -> 0.7 after treatment  - AG 23  - A1c 11.8 1/4  - Endo consulted and appreciated recs    Plan:   - stat 10 units lantus 1/5.   - Increase nightly lantus to 34 units qhs; premeal 16U  - low correction scale lispro q6h.  - Started on atorvastatin 80mg - glucose over 500s overnight of 1/4  - BHB 3.5 -> 0.7 after treatment  - AG 23  - A1c 11.8 1/4  - Endo consulted and appreciated recs    Plan:   - stat 10 units lantus 1/5.   - Increase nightly lantus to 34 units qhs; premeal 14U  - low correction scale lispro q6h.  - Started on atorvastatin 80mg - Persistent hypokalemia in 2.9s despite repletion  - urine Potassium 43  - Recheck BMP 2pm 1/8

## 2025-01-08 NOTE — PROGRESS NOTE ADULT - SUBJECTIVE AND OBJECTIVE BOX
PROGRESS NOTE:   Authored by Dr. Kathleen Zimmerman MD (PGY-1).  via TEAMS    Patient is a 64y old  Male who presents with a chief complaint of fever, HA (07 Jan 2025 15:15)      SUBJECTIVE / OVERNIGHT EVENTS:  No acute events overnight.     ADDITIONAL REVIEW OF SYSTEMS:  Patient denies fevers, chills, chest pain, shortness of breath, nausea, abdominal pain, diarrhea, constipation, dysuria, leg swelling, headache, light headedness.    MEDICATIONS  (STANDING):  atorvastatin 80 milliGRAM(s) Oral at bedtime  cefTRIAXone   IVPB 2000 milliGRAM(s) IV Intermittent every 12 hours  dextrose 5%. 1000 milliLiter(s) (50 mL/Hr) IV Continuous <Continuous>  dextrose 5%. 1000 milliLiter(s) (100 mL/Hr) IV Continuous <Continuous>  dextrose 50% Injectable 25 Gram(s) IV Push once  dextrose 50% Injectable 12.5 Gram(s) IV Push once  dextrose 50% Injectable 25 Gram(s) IV Push once  glucagon  Injectable 1 milliGRAM(s) IntraMuscular once  insulin glargine Injectable (LANTUS) 34 Unit(s) SubCutaneous at bedtime  insulin lispro (ADMELOG) corrective regimen sliding scale   SubCutaneous three times a day before meals  insulin lispro (ADMELOG) corrective regimen sliding scale   SubCutaneous <User Schedule>  insulin lispro Injectable (ADMELOG) 16 Unit(s) SubCutaneous three times a day before meals  lisinopril 20 milliGRAM(s) Oral daily  NIFEdipine XL 60 milliGRAM(s) Oral daily    MEDICATIONS  (PRN):  dextrose Oral Gel 15 Gram(s) Oral once PRN Blood Glucose LESS THAN 70 milliGRAM(s)/deciliter  melatonin 3 milliGRAM(s) Oral at bedtime PRN Insomnia      CAPILLARY BLOOD GLUCOSE      POCT Blood Glucose.: 177 mg/dL (08 Jan 2025 06:28)  POCT Blood Glucose.: 154 mg/dL (08 Jan 2025 01:50)  POCT Blood Glucose.: 125 mg/dL (07 Jan 2025 20:30)  POCT Blood Glucose.: 225 mg/dL (07 Jan 2025 16:38)  POCT Blood Glucose.: 322 mg/dL (07 Jan 2025 11:33)  POCT Blood Glucose.: 245 mg/dL (07 Jan 2025 07:31)    I&O's Summary    07 Jan 2025 07:01  -  08 Jan 2025 07:00  --------------------------------------------------------  IN: 700 mL / OUT: 0 mL / NET: 700 mL        PHYSICAL EXAM:  Vital Signs Last 24 Hrs  T(C): 36.7 (08 Jan 2025 04:44), Max: 37.1 (08 Jan 2025 00:01)  T(F): 98 (08 Jan 2025 04:44), Max: 98.8 (08 Jan 2025 00:01)  HR: 61 (08 Jan 2025 04:44) (61 - 81)  BP: 145/95 (08 Jan 2025 04:44) (142/78 - 166/96)  BP(mean): --  RR: 18 (08 Jan 2025 04:44) (18 - 20)  SpO2: 98% (08 Jan 2025 04:44) (97% - 98%)    Parameters below as of 08 Jan 2025 04:44  Patient On (Oxygen Delivery Method): room air        CONSTITUTIONAL: NAD  HEENT: Atraumatic, Normocephalic, EOMI, PERRL, Conjunctiva and sclera clear, moist mucous membranes  Neck: Supple, no elevated JVP.  RESPIRATORY: Normal respiratory effort; lungs are clear to auscultation bilaterally  CARDIOVASCULAR: Regular rate and rhythm, normal S1 and S2, no murmur/rub/gallop  ABDOMEN: Nontender to palpation, normoactive bowel sounds, no rebound/guarding; No hepatosplenomegaly  MSK: no clubbing or cyanosis of digits; no joint swelling or tenderness to palpation; no lower extremity edema; Peripheral pulses are 2+ bilaterally  NEURO: AOx3, no focal deficits  SKIN: No rashes or lesions    LABS:                        14.1   6.12  )-----------( 277      ( 08 Jan 2025 06:44 )             41.7     01-07    136  |  99  |  11  ----------------------------<  286[H]  2.8[LL]   |  22  |  0.91    Ca    8.4      07 Jan 2025 06:08  Phos  3.3     01-07  Mg     1.8     01-07            Urinalysis Basic - ( 07 Jan 2025 06:08 )    Color: x / Appearance: x / SG: x / pH: x  Gluc: 286 mg/dL / Ketone: x  / Bili: x / Urobili: x   Blood: x / Protein: x / Nitrite: x   Leuk Esterase: x / RBC: x / WBC x   Sq Epi: x / Non Sq Epi: x / Bacteria: x        Culture - Throat, Special (collected 05 Jan 2025 16:01)  Source: Throat Throat  Final Report (07 Jan 2025 20:11):    No beta hemolytic streptococci or Arcanobacterium haemolyticum isolated.    (Throat special examined for beta hemolytic streptococci and    Arcanobacterium haemolyticum)        Tele Reviewed:    RADIOLOGY & ADDITIONAL TESTS:  Results Reviewed:   Imaging Personally Reviewed:  Electrocardiogram Personally Reviewed:     PROGRESS NOTE:   Authored by Dr. Kathleen Zimmerman MD (PGY-1).  via TEAMS    Patient is a 64y old  Male who presents with a chief complaint of fever, HA (07 Jan 2025 15:15)      SUBJECTIVE / OVERNIGHT EVENTS:  No acute events overnight. C/o of headache behind his right eye -milder than initial onset.     ADDITIONAL REVIEW OF SYSTEMS:  Patient denies fevers, chills, chest pain, shortness of breath, nausea, abdominal pain, diarrhea, constipation, dysuria, leg swelling, light headedness.    MEDICATIONS  (STANDING):  atorvastatin 80 milliGRAM(s) Oral at bedtime  cefTRIAXone   IVPB 2000 milliGRAM(s) IV Intermittent every 12 hours  dextrose 5%. 1000 milliLiter(s) (50 mL/Hr) IV Continuous <Continuous>  dextrose 5%. 1000 milliLiter(s) (100 mL/Hr) IV Continuous <Continuous>  dextrose 50% Injectable 25 Gram(s) IV Push once  dextrose 50% Injectable 12.5 Gram(s) IV Push once  dextrose 50% Injectable 25 Gram(s) IV Push once  glucagon  Injectable 1 milliGRAM(s) IntraMuscular once  insulin glargine Injectable (LANTUS) 34 Unit(s) SubCutaneous at bedtime  insulin lispro (ADMELOG) corrective regimen sliding scale   SubCutaneous three times a day before meals  insulin lispro (ADMELOG) corrective regimen sliding scale   SubCutaneous <User Schedule>  insulin lispro Injectable (ADMELOG) 16 Unit(s) SubCutaneous three times a day before meals  lisinopril 20 milliGRAM(s) Oral daily  NIFEdipine XL 60 milliGRAM(s) Oral daily    MEDICATIONS  (PRN):  dextrose Oral Gel 15 Gram(s) Oral once PRN Blood Glucose LESS THAN 70 milliGRAM(s)/deciliter  melatonin 3 milliGRAM(s) Oral at bedtime PRN Insomnia      CAPILLARY BLOOD GLUCOSE      POCT Blood Glucose.: 177 mg/dL (08 Jan 2025 06:28)  POCT Blood Glucose.: 154 mg/dL (08 Jan 2025 01:50)  POCT Blood Glucose.: 125 mg/dL (07 Jan 2025 20:30)  POCT Blood Glucose.: 225 mg/dL (07 Jan 2025 16:38)  POCT Blood Glucose.: 322 mg/dL (07 Jan 2025 11:33)  POCT Blood Glucose.: 245 mg/dL (07 Jan 2025 07:31)    I&O's Summary    07 Jan 2025 07:01  -  08 Jan 2025 07:00  --------------------------------------------------------  IN: 700 mL / OUT: 0 mL / NET: 700 mL        PHYSICAL EXAM:  Vital Signs Last 24 Hrs  T(C): 36.7 (08 Jan 2025 04:44), Max: 37.1 (08 Jan 2025 00:01)  T(F): 98 (08 Jan 2025 04:44), Max: 98.8 (08 Jan 2025 00:01)  HR: 61 (08 Jan 2025 04:44) (61 - 81)  BP: 145/95 (08 Jan 2025 04:44) (142/78 - 166/96)  BP(mean): --  RR: 18 (08 Jan 2025 04:44) (18 - 20)  SpO2: 98% (08 Jan 2025 04:44) (97% - 98%)    Parameters below as of 08 Jan 2025 04:44  Patient On (Oxygen Delivery Method): room air        CONSTITUTIONAL: NAD  HEENT: Atraumatic, Normocephalic, EOMI, PERRL, Conjunctiva and sclera clear, moist mucous membranes  Neck: Supple, no elevated JVP.  RESPIRATORY: Normal respiratory effort; lungs are clear to auscultation bilaterally  CARDIOVASCULAR: Regular rate and rhythm, normal S1 and S2, no murmur/rub/gallop  ABDOMEN: Nontender to palpation, normoactive bowel sounds, no rebound/guarding; No hepatosplenomegaly  MSK: no clubbing or cyanosis of digits; no joint swelling or tenderness to palpation; no lower extremity edema; Peripheral pulses are 2+ bilaterally  NEURO: AOx3, no focal deficits  SKIN: No rashes or lesions    LABS:                        14.1   6.12  )-----------( 277      ( 08 Jan 2025 06:44 )             41.7     01-07    136  |  99  |  11  ----------------------------<  286[H]  2.8[LL]   |  22  |  0.91    Ca    8.4      07 Jan 2025 06:08  Phos  3.3     01-07  Mg     1.8     01-07            Urinalysis Basic - ( 07 Jan 2025 06:08 )    Color: x / Appearance: x / SG: x / pH: x  Gluc: 286 mg/dL / Ketone: x  / Bili: x / Urobili: x   Blood: x / Protein: x / Nitrite: x   Leuk Esterase: x / RBC: x / WBC x   Sq Epi: x / Non Sq Epi: x / Bacteria: x        Culture - Throat, Special (collected 05 Jan 2025 16:01)  Source: Throat Throat  Final Report (07 Jan 2025 20:11):    No beta hemolytic streptococci or Arcanobacterium haemolyticum isolated.    (Throat special examined for beta hemolytic streptococci and    Arcanobacterium haemolyticum)        Tele Reviewed:    RADIOLOGY & ADDITIONAL TESTS:  Results Reviewed:   Imaging Personally Reviewed:  Electrocardiogram Personally Reviewed:

## 2025-01-08 NOTE — PROGRESS NOTE ADULT - ASSESSMENT
A/P: 64y Male with pmhx of HTN, prior Anterior cervical discectomy and fusion w/ OSH Ortho 2023 presents as a transfer from [facility] for AMS and encephalitis. Overnight, patient had extreme BG elevation to 600 and labs consistent with DKA, he was treated with IVF and subcutaneous insulin with resolution of ketoacidosis. Patient also found to have A1c 11.8%, Endocrine team consulted for uncontrolled diabetes. Patient is high risk with high level decision making due to uncontrolled diabetes which places patient at high risk for cardiovascular and cerebrovascular events. Patient with lability of glucose requiring close monitoring and insulin adjustments. Patient is doing well, has been tolerating POs and eating full meals. FBG stable, no hypoglycemia. Noted BG postprandial on lower side to 83mg/dL pre-lunch -> will slightly lower mealtime insulin to prevent hypoglycemia. CGM Freestyle khris 3 to be applied by diabetes RN today/set up tom on phone. RN to provide patient/wife education with teahc back regarding insulin pen injections/ fingersticking for BG checks  prior to d/c. Patient is pendign d/c home today or tomorrow 1/9. Endocrine will closely monitor BG and adjust insulin as needed for BG goal 100-180mg/dL inpatient.     Met with patient and reviewed the following:    -A1c LEVEL: Present and goal  -Blood glucose goals: 100s to 150s as out pt  -Glucose monitoring frequency: ac and hs  -Hypoglycemia prevention, detection and treatment  -Healthy eating and portion control  -Insulin(s) action, time of administration and side effects  -Importance of follow up care    Patient able to verbalize understanding regarding the need for glucose monitoring, diet, DM meds and follow up care.       #Uncontrolled Type 2 diabetes mellitus new dx  A1C with Estimated Average Glucose Result: 11.8 % (01-05-25 @ 06:29)  A1C with Estimated Average Glucose Result: 11.8 % (01-04-25 @ 06:59)    Home regimen: none

## 2025-01-08 NOTE — PROVIDER CONTACT NOTE (CRITICAL VALUE NOTIFICATION) - ASSESSMENT
Pt AOx4. Aphasic
Pt AOx4. Notified by Lab @ 650 AM. Made several attempts to call [facility], and listed night time provider MD Monico Pérez and never received a response from [facility] #[id number]. Also attempts were made to call the day time provider as well with no answer until 0706 but call was dropped, called back and MD Kathleen Zimmerman answered and critical level was reported to her.
Pt is neurologically stable.
Pt AxOx4, VSS. Potassium 2.8 this morning.
stable

## 2025-01-08 NOTE — PROVIDER CONTACT NOTE (CRITICAL VALUE NOTIFICATION) - RECOMMENDATIONS
Notify provider and primary RN
Provider to order K+ supplement. Pass on to day nurse.
notify provider
Contact provider
supp w k

## 2025-01-08 NOTE — PROGRESS NOTE ADULT - PROBLEM SELECTOR PLAN 1
Inpatient Plan:  - Check BG TID AC, HS, and 2AM while on PO diet  - C/w Lantus 34u QHS  - Adjust Admelog to 14u TID AC (HOLD if NPO)  - Adjust to moderate dose Admelog correctional scales TID AC, HS, and 2AM  - RN to provide patient education regarding insulin pen injections and BG checks with glucometer prior to discharge    Discharge Plan:  - D/c on Lantus 34u QHS plus Admelog 14u TID AC (HOLD if skipping meal or pre-meal BG is <100mg/dL) plus oral Metformin 500mg BID (to be titrated up outpatient as tolerated to 1g BID) plus Jardiance 25mg once daily.   - Patient would benefit from a GLP1RA- recommend start outpatient if not contraindicated and based on thyroid nodule f/u results outpatient.   - Patient will need home care upon discharge due to new need for home insulin therapy.   - Freestyle Sanchez 3 applied to patient/tom on phone set up. Please send for rx d Sensor x3.   - Patient should check BG TID AC and HS at home. Can use CGM to monitor BGs but if BG <100mg/dL or >250mg/dL, patient should confirm with fingerstick BG. Please tell patient to contact Endocrinologist if BG <70mg/dL x1 or >200mg/dL consistently or >400mg/dL x1.  - Endocrine follow up: wants to establish care at [place] location: [address] [place] [address], [place] [address] Phone: [phone]- please provide clinic info in d/c paperwork. Added to appt list 1/8.   - Recommend routine outpatient ophthalmology and podiatry follow up.    Pt will need RX for basal insulin pen (ie. Basaglar, Lantus, Tresiba, Toujeo) and bolus insulin pen (ie. humalog/novolog/admelog) depending on insurance coverage; please send test scripts to see which is covered. Please send prescriptions for diabetes supplies (glucometer, test strips, lancets, alcohol swabs, insulin pen needles). Inpatient Plan:  - Check BG TID AC, HS, and 2AM while on PO diet  - C/w Lantus 34u QHS  - Adjust Admelog to 10u TID AC (HOLD if NPO)  - Adjust to moderate dose Admelog correctional scales TID AC, HS, and 2AM  - RN to provide patient education regarding insulin pen injections and BG checks with glucometer prior to discharge    Discharge Plan:  - D/c on Lantus 34u QHS plus Admelog 10u TID AC (HOLD if skipping meal or pre-meal BG is <100mg/dL) plus oral Metformin 500mg BID (to be titrated up outpatient as tolerated to 1g BID) plus Jardiance 25mg once daily.   - Patient would benefit from a GLP1RA- recommend start outpatient if not contraindicated and based on thyroid nodule f/u results outpatient.   - Patient will need home care upon discharge due to new need for home insulin therapy.   - Freestyle Sanchez 3 applied to patient/tom on phone set up. Please send for rx d Sensor x3.   - Patient should check BG TID AC and HS at home. Can use CGM to monitor BGs but if BG <100mg/dL or >250mg/dL, patient should confirm with fingerstick BG. Please tell patient to contact Endocrinologist if BG <70mg/dL x1 or >200mg/dL consistently or >400mg/dL x1.  - Endocrine follow up: wants to establish care at [place] location: [address] [place] [address], [place] [address] Phone: [phone]- please provide clinic info in d/c paperwork. Added to appt list 1/8.   - Recommend routine outpatient ophthalmology and podiatry follow up.    Pt will need RX for basal insulin pen (ie. Basaglar, Lantus, Tresiba, Toujeo) and bolus insulin pen (ie. humalog/novolog/admelog) depending on insurance coverage; please send test scripts to see which is covered. Please send prescriptions for diabetes supplies (glucometer, test strips, lancets, alcohol swabs, insulin pen needles).

## 2025-01-08 NOTE — PROGRESS NOTE ADULT - ASSESSMENT
65 y/o male with h/o of HTN  presents as a transfer from [facility] for fever and headache. In CTH at [facility], patient was reportedly found to have a SDH, the imaging or arrival to [facility] was repeated and this was not seen. Per radiology this may have been artifact as the follow up scan does not show this. s/p vancomycin and Zosyn prior to transfer and medications changed to CTX 2g, Vancomycin, and Acyclovir. Neurology and neurosurgery consulted; on neuro's exam, +nuchal rigidity and + brudzinski's sign.  No focal neurologic deficits noted. LP was performed and csf studies show an elevated glucose, elevated proteins and TNC of 9 with neutrophilic predominance. ID consulted. Blood culture NGTD, pleural culture gram stain NGTD. MRI 1/5/25 mild subarachnoid/subdural blood, with reactive inflammation in the dura. 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 The course complicated by glucose over 500 - BHB 3.5, AG 23, and Bicarb 14. After insulin, improved to BHB 0.7, AG 14, bicarb 22, and gluc 226. Endo consulted and started on lantus and premeal with mod SS. Patient clinically improved gradually and stable for dc with close follow up and Augmentin 875mg BID to finish a total 7 days (1/4 PM to 1/11 AM).

## 2025-01-08 NOTE — PROGRESS NOTE ADULT - SUBJECTIVE AND OBJECTIVE BOX
Patient seen today for follow up inpatient Diabetes Mellitus management.    Chief Complaint: Type 2 Diabetes Mellitus     INTERVAL HX:  Patient seen in [facility] 468 W1. Patient is alert and oriented, resting in bed with wife at bedside. Patient is feeling good, has been eating full meals and tolerating POs. FBG stable this am at 160mg/dL. No hypoglycemia. Noted better postprandial BG control with increased mealtime insulin. Noted postprandial BG to 83mg/dL today 1200 pre-lunch. Patient is pending d/c home today or tomorrow depending on potassium levels. Patient agrees to insulin pen/fingerstick teaching and would also like CGM prior to d/c. Blood glucose levels in the last 24hrs have been 83-225mg/dL.     Review of Systems:  General: As above.  Respiratory: Denies any SOB, RAMSEY, or cough.  Gastrointestinal: Denies any n/v/d or abdominal pain.   Endocrine: Denies any polyuria, polydipsia, polyphagia, visual changes, or numbness in feet.     Allergies  Cipro (Unknown)      Intolerances  None.       MEDICATIONS  (STANDING):  atorvastatin 80 milliGRAM(s) Oral at bedtime  cefTRIAXone   IVPB 2000 milliGRAM(s) IV Intermittent every 12 hours  dextrose 5%. 1000 milliLiter(s) IV Continuous <Continuous>  dextrose 5%. 1000 milliLiter(s) IV Continuous <Continuous>  dextrose 50% Injectable 25 Gram(s) IV Push once  dextrose 50% Injectable 12.5 Gram(s) IV Push once  dextrose 50% Injectable 25 Gram(s) IV Push once  dextrose Oral Gel 15 Gram(s) Oral once PRN  glucagon  Injectable 1 milliGRAM(s) IntraMuscular once  insulin glargine Injectable (LANTUS) 34 Unit(s) SubCutaneous at bedtime  insulin lispro (ADMELOG) corrective regimen sliding scale   SubCutaneous three times a day before meals  insulin lispro (ADMELOG) corrective regimen sliding scale   SubCutaneous <User Schedule>  insulin lispro Injectable (ADMELOG) 14 Unit(s) SubCutaneous three times a day before meals  lisinopril 20 milliGRAM(s) Oral daily  magnesium sulfate  IVPB 2 Gram(s) IV Intermittent once  melatonin 3 milliGRAM(s) Oral at bedtime PRN  NIFEdipine XL 60 milliGRAM(s) Oral daily  potassium chloride  10 mEq/100 mL IVPB 10 milliEquivalent(s) IV Intermittent every 1 hour      atorvastatin 80 milliGRAM(s) Oral at bedtime  dextrose 50% Injectable 25 Gram(s) IV Push once  dextrose 50% Injectable 12.5 Gram(s) IV Push once  dextrose 50% Injectable 25 Gram(s) IV Push once  dextrose Oral Gel 15 Gram(s) Oral once PRN  glucagon  Injectable 1 milliGRAM(s) IntraMuscular once  insulin glargine Injectable (LANTUS) 34 Unit(s) SubCutaneous at bedtime  insulin lispro (ADMELOG) corrective regimen sliding scale   SubCutaneous three times a day before meals  insulin lispro (ADMELOG) corrective regimen sliding scale   SubCutaneous <User Schedule>  insulin lispro Injectable (ADMELOG) 14 Unit(s) SubCutaneous three times a day before meals      insulin lispro (ADMELOG) corrective regimen sliding scale   SubCutaneous three times a day before meals  insulin lispro (ADMELOG) corrective regimen sliding scale   SubCutaneous <User Schedule>  insulin lispro Injectable (ADMELOG) 14 Unit(s) SubCutaneous three times a day before meals      PHYSICAL EXAM:  VITALS:   T(C): 36.6 (01-08-25 @ 09:43), Max: 37.1 (01-08-25 @ 00:01)  HR: 75 (01-08-25 @ 09:43) (61 - 81)  BP: 147/98 (01-08-25 @ 09:43) (142/78 - 166/96)  RR: 18 (01-08-25 @ 09:43) (18 - 18)  SpO2: 99% (01-08-25 @ 09:43) (97% - 99%)    GENERAL: In no acute distress  Respiratory: Respirations unlabored  Extremities: Warm and dry, no edema  NEURO: Alert and oriented, appropriate     LABS:  POCT Blood Glucose.: 83 mg/dL (01-08-25 @ 11:41)  POCT Blood Glucose.: 168 mg/dL (01-08-25 @ 07:35)  POCT Blood Glucose.: 177 mg/dL (01-08-25 @ 06:28)  POCT Blood Glucose.: 154 mg/dL (01-08-25 @ 01:50)  POCT Blood Glucose.: 125 mg/dL (01-07-25 @ 20:30)  POCT Blood Glucose.: 225 mg/dL (01-07-25 @ 16:38)  POCT Blood Glucose.: 322 mg/dL (01-07-25 @ 11:33)  POCT Blood Glucose.: 245 mg/dL (01-07-25 @ 07:31)  POCT Blood Glucose.: 235 mg/dL (01-07-25 @ 04:20)  POCT Blood Glucose.: 274 mg/dL (01-06-25 @ 23:51)  POCT Blood Glucose.: 290 mg/dL (01-06-25 @ 20:49)  POCT Blood Glucose.: 264 mg/dL (01-06-25 @ 16:06)  POCT Blood Glucose.: 270 mg/dL (01-06-25 @ 11:27)  POCT Blood Glucose.: 214 mg/dL (01-06-25 @ 05:41)  POCT Blood Glucose.: 218 mg/dL (01-06-25 @ 01:53)  POCT Blood Glucose.: 289 mg/dL (01-05-25 @ 21:04)  POCT Blood Glucose.: 277 mg/dL (01-05-25 @ 16:27)                          14.1   6.12  )-----------( 277      ( 08 Jan 2025 06:44 )             41.7     01-08    139  |  101  |  11  ----------------------------<  189[H]  2.9[LL]   |  23  |  0.93    Ca    8.7      08 Jan 2025 06:43  Phos  3.5     01-08  Mg     1.9     01-08    TPro  7.0  /  Alb  3.7  /  TBili  0.4  /  DBili  x   /  AST  23  /  ALT  26  /  AlkPhos  73  01-08    LIVER FUNCTIONS - ( 08 Jan 2025 06:43 )  Alb: 3.7 g/dL / Pro: 7.0 g/dL / ALK PHOS: 73 U/L / ALT: 26 U/L / AST: 23 U/L / GGT: x               Urinalysis Basic - ( 08 Jan 2025 06:43 )    Color: x / Appearance: x / SG: x / pH: x  Gluc: 189 mg/dL / Ketone: x  / Bili: x / Urobili: x   Blood: x / Protein: x / Nitrite: x   Leuk Esterase: x / RBC: x / WBC x   Sq Epi: x / Non Sq Epi: x / Bacteria: x        Culture - Throat, Special (collected 05 Jan 2025 16:01)  Source: Throat Throat  Final Report (07 Jan 2025 20:11):    No beta hemolytic streptococci or Arcanobacterium haemolyticum isolated.    (Throat special examined for beta hemolytic streptococci and    Arcanobacterium haemolyticum)        A1C with Estimated Average Glucose Result: A1C with Estimated Average Glucose Result: 11.8 % (01-05-25 @ 06:29)  A1C with Estimated Average Glucose Result: 11.8 % (01-04-25 @ 06:59)

## 2025-01-09 VITALS
TEMPERATURE: 99 F | HEART RATE: 65 BPM | RESPIRATION RATE: 18 BRPM | OXYGEN SATURATION: 98 % | SYSTOLIC BLOOD PRESSURE: 146 MMHG | DIASTOLIC BLOOD PRESSURE: 90 MMHG

## 2025-01-09 LAB
ALBUMIN SERPL ELPH-MCNC: 3.3 G/DL — SIGNIFICANT CHANGE UP (ref 3.3–5)
ALP SERPL-CCNC: 71 U/L — SIGNIFICANT CHANGE UP (ref 40–120)
ALT FLD-CCNC: 30 U/L — SIGNIFICANT CHANGE UP (ref 10–45)
ANION GAP SERPL CALC-SCNC: 13 MMOL/L — SIGNIFICANT CHANGE UP (ref 5–17)
AST SERPL-CCNC: 26 U/L — SIGNIFICANT CHANGE UP (ref 10–40)
BASOPHILS # BLD AUTO: 0.04 K/UL — SIGNIFICANT CHANGE UP (ref 0–0.2)
BASOPHILS NFR BLD AUTO: 0.7 % — SIGNIFICANT CHANGE UP (ref 0–2)
BILIRUB SERPL-MCNC: 0.4 MG/DL — SIGNIFICANT CHANGE UP (ref 0.2–1.2)
BUN SERPL-MCNC: 12 MG/DL — SIGNIFICANT CHANGE UP (ref 7–23)
CALCIUM SERPL-MCNC: 9 MG/DL — SIGNIFICANT CHANGE UP (ref 8.4–10.5)
CHLORIDE SERPL-SCNC: 101 MMOL/L — SIGNIFICANT CHANGE UP (ref 96–108)
CO2 SERPL-SCNC: 21 MMOL/L — LOW (ref 22–31)
CREAT SERPL-MCNC: 0.9 MG/DL — SIGNIFICANT CHANGE UP (ref 0.5–1.3)
CULTURE RESULTS: SIGNIFICANT CHANGE UP
CULTURE RESULTS: SIGNIFICANT CHANGE UP
EGFR: 97 ML/MIN/1.73M2 — SIGNIFICANT CHANGE UP
EOSINOPHIL # BLD AUTO: 0.2 K/UL — SIGNIFICANT CHANGE UP (ref 0–0.5)
EOSINOPHIL NFR BLD AUTO: 3.3 % — SIGNIFICANT CHANGE UP (ref 0–6)
GLUCOSE BLDC GLUCOMTR-MCNC: 122 MG/DL — HIGH (ref 70–99)
GLUCOSE BLDC GLUCOMTR-MCNC: 158 MG/DL — HIGH (ref 70–99)
GLUCOSE BLDC GLUCOMTR-MCNC: 169 MG/DL — HIGH (ref 70–99)
GLUCOSE BLDC GLUCOMTR-MCNC: 170 MG/DL — HIGH (ref 70–99)
GLUCOSE BLDC GLUCOMTR-MCNC: 171 MG/DL — HIGH (ref 70–99)
GLUCOSE BLDC GLUCOMTR-MCNC: 237 MG/DL — HIGH (ref 70–99)
GLUCOSE SERPL-MCNC: 191 MG/DL — HIGH (ref 70–99)
HCT VFR BLD CALC: 39.3 % — SIGNIFICANT CHANGE UP (ref 39–50)
HGB BLD-MCNC: 13.3 G/DL — SIGNIFICANT CHANGE UP (ref 13–17)
IMM GRANULOCYTES NFR BLD AUTO: 0.3 % — SIGNIFICANT CHANGE UP (ref 0–0.9)
LYMPHOCYTES # BLD AUTO: 1.88 K/UL — SIGNIFICANT CHANGE UP (ref 1–3.3)
LYMPHOCYTES # BLD AUTO: 30.9 % — SIGNIFICANT CHANGE UP (ref 13–44)
MAGNESIUM SERPL-MCNC: 2 MG/DL — SIGNIFICANT CHANGE UP (ref 1.6–2.6)
MCHC RBC-ENTMCNC: 28.2 PG — SIGNIFICANT CHANGE UP (ref 27–34)
MCHC RBC-ENTMCNC: 33.8 G/DL — SIGNIFICANT CHANGE UP (ref 32–36)
MCV RBC AUTO: 83.3 FL — SIGNIFICANT CHANGE UP (ref 80–100)
MONOCYTES # BLD AUTO: 0.57 K/UL — SIGNIFICANT CHANGE UP (ref 0–0.9)
MONOCYTES NFR BLD AUTO: 9.4 % — SIGNIFICANT CHANGE UP (ref 2–14)
NEUTROPHILS # BLD AUTO: 3.38 K/UL — SIGNIFICANT CHANGE UP (ref 1.8–7.4)
NEUTROPHILS NFR BLD AUTO: 55.4 % — SIGNIFICANT CHANGE UP (ref 43–77)
NRBC # BLD: 0 /100 WBCS — SIGNIFICANT CHANGE UP (ref 0–0)
PHOSPHATE SERPL-MCNC: 3.6 MG/DL — SIGNIFICANT CHANGE UP (ref 2.5–4.5)
PLATELET # BLD AUTO: 287 K/UL — SIGNIFICANT CHANGE UP (ref 150–400)
POTASSIUM SERPL-MCNC: 3.2 MMOL/L — LOW (ref 3.5–5.3)
POTASSIUM SERPL-SCNC: 3.2 MMOL/L — LOW (ref 3.5–5.3)
PROT SERPL-MCNC: 6.6 G/DL — SIGNIFICANT CHANGE UP (ref 6–8.3)
RBC # BLD: 4.72 M/UL — SIGNIFICANT CHANGE UP (ref 4.2–5.8)
RBC # FLD: 14.3 % — SIGNIFICANT CHANGE UP (ref 10.3–14.5)
SODIUM SERPL-SCNC: 135 MMOL/L — SIGNIFICANT CHANGE UP (ref 135–145)
SPECIMEN SOURCE: SIGNIFICANT CHANGE UP
SPECIMEN SOURCE: SIGNIFICANT CHANGE UP
WBC # BLD: 6.09 K/UL — SIGNIFICANT CHANGE UP (ref 3.8–10.5)
WBC # FLD AUTO: 6.09 K/UL — SIGNIFICANT CHANGE UP (ref 3.8–10.5)

## 2025-01-09 PROCEDURE — 81001 URINALYSIS AUTO W/SCOPE: CPT

## 2025-01-09 PROCEDURE — 71260 CT THORAX DX C+: CPT | Mod: MC

## 2025-01-09 PROCEDURE — 87483 CNS DNA AMP PROBE TYPE 12-25: CPT

## 2025-01-09 PROCEDURE — 87476 LYME DIS DNA AMP PROBE: CPT

## 2025-01-09 PROCEDURE — 36415 COLL VENOUS BLD VENIPUNCTURE: CPT

## 2025-01-09 PROCEDURE — 70498 CT ANGIOGRAPHY NECK: CPT | Mod: MC

## 2025-01-09 PROCEDURE — 86403 PARTICLE AGGLUT ANTBDY SCRN: CPT

## 2025-01-09 PROCEDURE — 70553 MRI BRAIN STEM W/O & W/DYE: CPT | Mod: MC

## 2025-01-09 PROCEDURE — 99285 EMERGENCY DEPT VISIT HI MDM: CPT

## 2025-01-09 PROCEDURE — 84132 ASSAY OF SERUM POTASSIUM: CPT

## 2025-01-09 PROCEDURE — 85652 RBC SED RATE AUTOMATED: CPT

## 2025-01-09 PROCEDURE — A9585: CPT

## 2025-01-09 PROCEDURE — 87116 MYCOBACTERIA CULTURE: CPT

## 2025-01-09 PROCEDURE — 84295 ASSAY OF SERUM SODIUM: CPT

## 2025-01-09 PROCEDURE — 74177 CT ABD & PELVIS W/CONTRAST: CPT | Mod: MC

## 2025-01-09 PROCEDURE — 99239 HOSP IP/OBS DSCHRG MGMT >30: CPT | Mod: GC

## 2025-01-09 PROCEDURE — 70450 CT HEAD/BRAIN W/O DYE: CPT | Mod: MC

## 2025-01-09 PROCEDURE — 86665 EPSTEIN-BARR CAPSID VCA: CPT

## 2025-01-09 PROCEDURE — 87389 HIV-1 AG W/HIV-1&-2 AB AG IA: CPT

## 2025-01-09 PROCEDURE — 82803 BLOOD GASES ANY COMBINATION: CPT

## 2025-01-09 PROCEDURE — 80202 ASSAY OF VANCOMYCIN: CPT

## 2025-01-09 PROCEDURE — 82947 ASSAY GLUCOSE BLOOD QUANT: CPT

## 2025-01-09 PROCEDURE — 97166 OT EVAL MOD COMPLEX 45 MIN: CPT

## 2025-01-09 PROCEDURE — 86788 WEST NILE VIRUS AB IGM: CPT

## 2025-01-09 PROCEDURE — 96375 TX/PRO/DX INJ NEW DRUG ADDON: CPT

## 2025-01-09 PROCEDURE — 84100 ASSAY OF PHOSPHORUS: CPT

## 2025-01-09 PROCEDURE — 82330 ASSAY OF CALCIUM: CPT

## 2025-01-09 PROCEDURE — 89051 BODY FLUID CELL COUNT: CPT

## 2025-01-09 PROCEDURE — 87205 SMEAR GRAM STAIN: CPT

## 2025-01-09 PROCEDURE — 85730 THROMBOPLASTIN TIME PARTIAL: CPT

## 2025-01-09 PROCEDURE — 80053 COMPREHEN METABOLIC PANEL: CPT

## 2025-01-09 PROCEDURE — 84133 ASSAY OF URINE POTASSIUM: CPT

## 2025-01-09 PROCEDURE — 87102 FUNGUS ISOLATION CULTURE: CPT

## 2025-01-09 PROCEDURE — 70543 MRI ORBT/FAC/NCK W/O &W/DYE: CPT | Mod: MC

## 2025-01-09 PROCEDURE — 87637 SARSCOV2&INF A&B&RSV AMP PRB: CPT

## 2025-01-09 PROCEDURE — 82010 KETONE BODYS QUAN: CPT

## 2025-01-09 PROCEDURE — 85014 HEMATOCRIT: CPT

## 2025-01-09 PROCEDURE — 96374 THER/PROPH/DIAG INJ IV PUSH: CPT

## 2025-01-09 PROCEDURE — 84157 ASSAY OF PROTEIN OTHER: CPT

## 2025-01-09 PROCEDURE — 83735 ASSAY OF MAGNESIUM: CPT

## 2025-01-09 PROCEDURE — 87070 CULTURE OTHR SPECIMN AEROBIC: CPT

## 2025-01-09 PROCEDURE — 82435 ASSAY OF BLOOD CHLORIDE: CPT

## 2025-01-09 PROCEDURE — 85610 PROTHROMBIN TIME: CPT

## 2025-01-09 PROCEDURE — 87040 BLOOD CULTURE FOR BACTERIA: CPT

## 2025-01-09 PROCEDURE — 85025 COMPLETE CBC W/AUTO DIFF WBC: CPT

## 2025-01-09 PROCEDURE — 92611 MOTION FLUOROSCOPY/SWALLOW: CPT

## 2025-01-09 PROCEDURE — 87015 SPECIMEN INFECT AGNT CONCNTJ: CPT

## 2025-01-09 PROCEDURE — 70496 CT ANGIOGRAPHY HEAD: CPT | Mod: MC

## 2025-01-09 PROCEDURE — 72156 MRI NECK SPINE W/O & W/DYE: CPT | Mod: MC

## 2025-01-09 PROCEDURE — 86592 SYPHILIS TEST NON-TREP QUAL: CPT

## 2025-01-09 PROCEDURE — 92610 EVALUATE SWALLOWING FUNCTION: CPT

## 2025-01-09 PROCEDURE — 87529 HSV DNA AMP PROBE: CPT

## 2025-01-09 PROCEDURE — 85027 COMPLETE CBC AUTOMATED: CPT

## 2025-01-09 PROCEDURE — 86789 WEST NILE VIRUS ANTIBODY: CPT

## 2025-01-09 PROCEDURE — 83605 ASSAY OF LACTIC ACID: CPT

## 2025-01-09 PROCEDURE — 74230 X-RAY XM SWLNG FUNCJ C+: CPT

## 2025-01-09 PROCEDURE — 87799 DETECT AGENT NOS DNA QUANT: CPT

## 2025-01-09 PROCEDURE — 87641 MR-STAPH DNA AMP PROBE: CPT

## 2025-01-09 PROCEDURE — 84145 PROCALCITONIN (PCT): CPT

## 2025-01-09 PROCEDURE — 85018 HEMOGLOBIN: CPT

## 2025-01-09 PROCEDURE — 87640 STAPH A DNA AMP PROBE: CPT

## 2025-01-09 PROCEDURE — 83615 LACTATE (LD) (LDH) ENZYME: CPT

## 2025-01-09 PROCEDURE — 97161 PT EVAL LOW COMPLEX 20 MIN: CPT

## 2025-01-09 PROCEDURE — 80061 LIPID PANEL: CPT

## 2025-01-09 PROCEDURE — 86663 EPSTEIN-BARR ANTIBODY: CPT

## 2025-01-09 PROCEDURE — 86140 C-REACTIVE PROTEIN: CPT

## 2025-01-09 PROCEDURE — 80048 BASIC METABOLIC PNL TOTAL CA: CPT

## 2025-01-09 PROCEDURE — 92526 ORAL FUNCTION THERAPY: CPT

## 2025-01-09 PROCEDURE — 84443 ASSAY THYROID STIM HORMONE: CPT

## 2025-01-09 PROCEDURE — 82962 GLUCOSE BLOOD TEST: CPT

## 2025-01-09 PROCEDURE — 82945 GLUCOSE OTHER FLUID: CPT

## 2025-01-09 PROCEDURE — 83036 HEMOGLOBIN GLYCOSYLATED A1C: CPT

## 2025-01-09 PROCEDURE — 86664 EPSTEIN-BARR NUCLEAR ANTIGEN: CPT

## 2025-01-09 RX ORDER — AMOXICILLIN/POTASSIUM CLAV 875-125 MG
875 TABLET ORAL
Qty: 4 | Refills: 0
Start: 2025-01-09 | End: 2025-01-10

## 2025-01-09 RX ORDER — SOD PHOS DI, MONO/K PHOS MONO 250 MG
1 TABLET ORAL ONCE
Refills: 0 | Status: COMPLETED | OUTPATIENT
Start: 2025-01-09 | End: 2025-01-09

## 2025-01-09 RX ORDER — METFORMIN 850 MG/1
1 TABLET ORAL
Qty: 120 | Refills: 0
Start: 2025-01-09 | End: 2025-03-09

## 2025-01-09 RX ORDER — SPIRONOLACTONE 50 MG/1
1 TABLET ORAL
Qty: 60 | Refills: 0
Start: 2025-01-09 | End: 2025-03-09

## 2025-01-09 RX ORDER — INSULIN LISPRO 100/ML
10 VIAL (ML) SUBCUTANEOUS
Qty: 6 | Refills: 0
Start: 2025-01-09 | End: 2025-03-09

## 2025-01-09 RX ORDER — POTASSIUM CHLORIDE 600 MG/1
40 TABLET, FILM COATED, EXTENDED RELEASE ORAL ONCE
Refills: 0 | Status: COMPLETED | OUTPATIENT
Start: 2025-01-09 | End: 2025-01-09

## 2025-01-09 RX ORDER — POTASSIUM CHLORIDE 600 MG/1
2 TABLET, FILM COATED, EXTENDED RELEASE ORAL
Qty: 14 | Refills: 0
Start: 2025-01-09 | End: 2025-01-15

## 2025-01-09 RX ORDER — LISINOPRIL 30 MG/1
1 TABLET ORAL
Qty: 30 | Refills: 1
Start: 2025-01-09 | End: 2025-03-09

## 2025-01-09 RX ORDER — INSULIN GLARGINE-YFGN 100 [IU]/ML
34 INJECTION, SOLUTION SUBCUTANEOUS
Qty: 7 | Refills: 0
Start: 2025-01-09 | End: 2025-03-09

## 2025-01-09 RX ORDER — POTASSIUM CHLORIDE 600 MG/1
10 TABLET, FILM COATED, EXTENDED RELEASE ORAL
Refills: 0 | Status: COMPLETED | OUTPATIENT
Start: 2025-01-09 | End: 2025-01-09

## 2025-01-09 RX ORDER — EMPAGLIFLOZIN 10 MG/1
1 TABLET, FILM COATED ORAL
Qty: 60 | Refills: 0
Start: 2025-01-09 | End: 2025-03-09

## 2025-01-09 RX ADMIN — Medication 10 UNIT(S): at 17:27

## 2025-01-09 RX ADMIN — Medication 2: at 12:42

## 2025-01-09 RX ADMIN — POTASSIUM CHLORIDE 100 MILLIEQUIVALENT(S): 600 TABLET, FILM COATED, EXTENDED RELEASE ORAL at 12:41

## 2025-01-09 RX ADMIN — Medication 1 PACKET(S): at 04:10

## 2025-01-09 RX ADMIN — Medication 10 UNIT(S): at 09:49

## 2025-01-09 RX ADMIN — SPIRONOLACTONE 25 MILLIGRAM(S): 50 TABLET ORAL at 05:09

## 2025-01-09 RX ADMIN — POTASSIUM CHLORIDE 40 MILLIEQUIVALENT(S): 600 TABLET, FILM COATED, EXTENDED RELEASE ORAL at 09:27

## 2025-01-09 RX ADMIN — POTASSIUM CHLORIDE 100 MILLIEQUIVALENT(S): 600 TABLET, FILM COATED, EXTENDED RELEASE ORAL at 09:28

## 2025-01-09 RX ADMIN — LISINOPRIL 20 MILLIGRAM(S): 30 TABLET ORAL at 05:09

## 2025-01-09 RX ADMIN — NIFEDIPINE 60 MILLIGRAM(S): 60 TABLET, EXTENDED RELEASE ORAL at 05:09

## 2025-01-09 RX ADMIN — Medication 4: at 09:48

## 2025-01-09 RX ADMIN — Medication 10 UNIT(S): at 12:41

## 2025-01-09 RX ADMIN — CEFTRIAXONE SODIUM 100 MILLIGRAM(S): 1 INJECTION, POWDER, FOR SOLUTION INTRAMUSCULAR; INTRAVENOUS at 05:44

## 2025-01-09 NOTE — PROGRESS NOTE ADULT - PROBLEM SELECTOR PLAN 1
Concerning for meningitis vs subdural hemorrhage (seen on CT at [facility]) : fever, HA, +nuchal rigidity and a + brudzinski's sign on neurology's exam. s/p vancomycin and Zosyn prior to transfer   - LP was performed and csf studies show an glucose 258, elevated proteins 56 and TNC of 9 with neutrophilic predominance.   - Blood culture negative; CSF Culture gram stain NGTD  - MRI brain w wo contrast: mild subarachnoid/subdural blood, with reactive inflammation in the dura. 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  - MR c spine/neck with contrast: Mild degenerative changes of the cervical spine.   - WBC wnl 1/5/25, CPR wnl  - s/p acyclovir and Vanc  - CT CAP: No acute intra-thoracic, abdominal or pelvic pathology    Plan:  - c/w CTX 2g    - EBV: past infection; Staph a PCR +, ESR elev 39    - Cryptoc, [place], HIV 1/2, MRSA, HSV 1/2 PCR, CRP, procal, borrelia, GENEVIEVE virus DNA, CMV negative    - Pt also with exudative lesion at the back of his throat, get throat swab  - ID consulted; appreciated recs  - Neuro check q4  - Neurology/neurosurgery consulted  - Given subdural/subarachnoid bleed on MRI and overnight hyperglycemic episode, etiology may be DM induced,        - neurology: no acute intervention needed at this time. Follow up outpatient.

## 2025-01-09 NOTE — PROGRESS NOTE ADULT - PROBLEM SELECTOR PLAN 8
Diet: regular, fall/asp precaution, s/s consulted - MBS study negative 1/7  DVT: scd as subdural on MRI  Dispo: PT/OT no skill needed
Diet: regular, fall/asp precaution, s/s consulted - MBS study negative 1/7  DVT: scd as subdural on MRI  Dispo: PT/OT no skill needed

## 2025-01-09 NOTE — PROGRESS NOTE ADULT - PROBLEM SELECTOR PLAN 7
- CTAP 1/6: Right adrenal nodule, 1.1 cm, indeterminate. Consider further evaluation with CT or MR adrenal protocol.  - Has a referral from PCP  - f/u outpt endo

## 2025-01-09 NOTE — PROGRESS NOTE ADULT - PROBLEM SELECTOR PLAN 3
- glucose over 500s overnight of 1/4  - BHB 3.5 -> 0.7 after treatment  - AG 23  - A1c 11.8 1/4  - Endo consulted and appreciated recs  - stat 10 units lantus 1/5.     Plan:   - Increased nightly lantus to 34 units qhs; premeal 10U  - low correction scale lispro q6h.  - Started on atorvastatin 80mg  - Follow outpatient

## 2025-01-09 NOTE — PROGRESS NOTE ADULT - PROVIDER SPECIALTY LIST ADULT
Endocrinology
Internal Medicine
Infectious Disease
Infectious Disease
Internal Medicine
Endocrinology
Internal Medicine

## 2025-01-09 NOTE — PROGRESS NOTE ADULT - ASSESSMENT
63 y/o male with h/o of HTN  presents as a transfer from [facility] for fever and headache. In CTH at [facility], patient was reportedly found to have a SDH, the imaging or arrival to [facility] was repeated and this was not seen. Per radiology this may have been artifact as the follow up scan does not show this. s/p vancomycin and Zosyn prior to transfer and medications changed to CTX 2g, Vancomycin, and Acyclovir. Neurology and neurosurgery consulted; on neuro's exam, +nuchal rigidity and + brudzinski's sign.  No focal neurologic deficits noted. LP was performed and csf studies show an elevated glucose, elevated proteins and TNC of 9 with neutrophilic predominance. ID consulted. Blood culture NGTD, pleural culture gram stain NGTD. MRI 1/5/25 mild subarachnoid/subdural blood, with reactive inflammation in the dura. 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 The course complicated by glucose over 500 - BHB 3.5, AG 23, and Bicarb 14. After insulin, improved to BHB 0.7, AG 14, bicarb 22, and gluc 226. Endo consulted and started on lantus and premeal with mod SS. Patient was persistently hypokalemic - improved with repletion. Patient clinically improved gradually and stable for dc with close follow up and Augmentin 875mg BID to finish a total 7 days (1/4 PM to 1/11 AM).

## 2025-01-09 NOTE — PROGRESS NOTE ADULT - SUBJECTIVE AND OBJECTIVE BOX
PROGRESS NOTE:   Authored by Dr. Kathleen Zimmerman MD (PGY-1).  via TEAMS    Patient is a 64y old  Male who presents with a chief complaint of Fever, HA (08 Jan 2025 12:00)      SUBJECTIVE / OVERNIGHT EVENTS:  No acute events overnight.     ADDITIONAL REVIEW OF SYSTEMS:  Patient denies fevers, chills, chest pain, shortness of breath, nausea, abdominal pain, diarrhea, constipation, dysuria, leg swelling, headache, light headedness.    MEDICATIONS  (STANDING):  atorvastatin 80 milliGRAM(s) Oral at bedtime  cefTRIAXone   IVPB 2000 milliGRAM(s) IV Intermittent every 12 hours  dextrose 5%. 1000 milliLiter(s) (50 mL/Hr) IV Continuous <Continuous>  dextrose 5%. 1000 milliLiter(s) (100 mL/Hr) IV Continuous <Continuous>  dextrose 50% Injectable 25 Gram(s) IV Push once  dextrose 50% Injectable 12.5 Gram(s) IV Push once  dextrose 50% Injectable 25 Gram(s) IV Push once  glucagon  Injectable 1 milliGRAM(s) IntraMuscular once  insulin glargine Injectable (LANTUS) 34 Unit(s) SubCutaneous at bedtime  insulin lispro (ADMELOG) corrective regimen sliding scale   SubCutaneous three times a day before meals  insulin lispro (ADMELOG) corrective regimen sliding scale   SubCutaneous <User Schedule>  insulin lispro Injectable (ADMELOG) 10 Unit(s) SubCutaneous three times a day before meals  lisinopril 20 milliGRAM(s) Oral daily  NIFEdipine XL 60 milliGRAM(s) Oral daily  potassium chloride    Tablet ER 40 milliEquivalent(s) Oral once  potassium chloride  10 mEq/100 mL IVPB 10 milliEquivalent(s) IV Intermittent every 1 hour  spironolactone 25 milliGRAM(s) Oral daily    MEDICATIONS  (PRN):  dextrose Oral Gel 15 Gram(s) Oral once PRN Blood Glucose LESS THAN 70 milliGRAM(s)/deciliter  melatonin 3 milliGRAM(s) Oral at bedtime PRN Insomnia      CAPILLARY BLOOD GLUCOSE      POCT Blood Glucose.: 170 mg/dL (09 Jan 2025 05:45)  POCT Blood Glucose.: 171 mg/dL (09 Jan 2025 01:23)  POCT Blood Glucose.: 209 mg/dL (08 Jan 2025 21:13)  POCT Blood Glucose.: 170 mg/dL (08 Jan 2025 16:28)  POCT Blood Glucose.: 83 mg/dL (08 Jan 2025 11:41)    I&O's Summary    08 Jan 2025 07:01  -  09 Jan 2025 07:00  --------------------------------------------------------  IN: 620 mL / OUT: 0 mL / NET: 620 mL        PHYSICAL EXAM:  Vital Signs Last 24 Hrs  T(C): 37.1 (09 Jan 2025 04:50), Max: 37.1 (09 Jan 2025 04:50)  T(F): 98.8 (09 Jan 2025 04:50), Max: 98.8 (09 Jan 2025 04:50)  HR: 58 (09 Jan 2025 04:50) (58 - 81)  BP: 148/102 (09 Jan 2025 04:50) (141/92 - 164/94)  BP(mean): --  RR: 18 (09 Jan 2025 04:50) (18 - 18)  SpO2: 98% (09 Jan 2025 04:50) (97% - 99%)    Parameters below as of 09 Jan 2025 04:50  Patient On (Oxygen Delivery Method): room air        CONSTITUTIONAL: NAD, well-developed  HEENT: Atraumatic, Normocephalic, EOMI, PERRL, Conjunctiva and sclera clear, moist mucous membranes  Neck: Supple, no elevated JVP.  RESPIRATORY: Normal respiratory effort; lungs are clear to auscultation bilaterally  CARDIOVASCULAR: Regular rate and rhythm, normal S1 and S2, no murmur/rub/gallop  ABDOMEN: Nontender to palpation, normoactive bowel sounds, no rebound/guarding; No hepatosplenomegaly  MSK: no clubbing or cyanosis of digits; no joint swelling or tenderness to palpation; no lower extremity edema; Peripheral pulses are 2+ bilaterally  NEURO: AOx3, no focal deficits  SKIN: No rashes or lesions    LABS:                        13.3   6.09  )-----------( 287      ( 09 Jan 2025 06:00 )             39.3     01-09    135  |  101  |  12  ----------------------------<  191[H]  3.2[L]   |  21[L]  |  0.90    Ca    9.0      09 Jan 2025 06:00  Phos  3.6     01-09  Mg     2.0     01-09    TPro  6.6  /  Alb  3.3  /  TBili  0.4  /  DBili  x   /  AST  26  /  ALT  30  /  AlkPhos  71  01-09          Urinalysis Basic - ( 09 Jan 2025 06:00 )    Color: x / Appearance: x / SG: x / pH: x  Gluc: 191 mg/dL / Ketone: x  / Bili: x / Urobili: x   Blood: x / Protein: x / Nitrite: x   Leuk Esterase: x / RBC: x / WBC x   Sq Epi: x / Non Sq Epi: x / Bacteria: x          Tele Reviewed:    RADIOLOGY & ADDITIONAL TESTS:  Results Reviewed:   Imaging Personally Reviewed:  Electrocardiogram Personally Reviewed:

## 2025-01-09 NOTE — PROGRESS NOTE ADULT - ATTENDING COMMENTS
Encephalopathy with HA/fever and expressive aphasia- concern for bacterial meningitis- s/p LP, but low suspicion now given improvement  Continue empiric treatment with vancomycin, CTX - ID recs appreciated, obtain CT chest/abd/pelvis, neurology following  Hyperglycemia- A1c 11.8- endocrine seeing for newly diagnosed DM- on basal/bolus    Unclear etiology of his expressive aphasia since no stroke was seen on MRI.     Obtain MBS per SLP recs.
Encephalopathy with HA/fever and expressive aphasia- concern for bacterial meningitis- s/p LP, but low suspicion now given rapid improvement  Continue empiric treatment with vancomycin, CTX - ID recs appreciated, no source of infection seen on CT chest/abd/pelvis  Hyperglycemia- A1c 11.8- endocrine seeing for newly diagnosed DM- on basal/bolus  Hypokalemia - replete aggressively    Unclear etiology of his expressive aphasia since no stroke was seen on MRI but it is improving daily.    Potential DC home 1/8.
Encephalopathy with HA/fever and expressive aphasia- concern for bacterial meningitis- s/p LP done in the ED.   Continue empiric treatment with vancomycin, CTX, acyclovir- ID consult underway, neurology following  Hyperglycemia- A1c 11.8- endocrine seeing for newly diagnosed DM- on basal/bolus
-Encephalopathy with HA/fever and expressive aphasia- concern for bacterial meningitis - s/p LP, but low suspicion now given rapid improvement  -Continue empiric treatment with vancomycin, CTX - ID recs appreciated, no source of infection seen on CT chest/abd/pelvis, will complete augmentin until 1/10.  -Hyperglycemia- A1c 11.8- endocrine seeing for newly diagnosed DM- on basal/bolus  -Hypokalemia - repleted aggressively. Unclear why potassium is persistently low. DC home with lisinopril, spironolactone, and 40meq KCl qd.     -Unclear etiology of his expressive aphasia since no stroke was seen on MRI but it has now resolved.    Stable for discharge home.
Encephalopathy with HA/fever and expressive aphasia- concern for bacterial meningitis- s/p LP, but low suspicion now given rapid improvement  Continue empiric treatment with vancomycin, CTX - ID recs appreciated, no source of infection seen on CT chest/abd/pelvis  Hyperglycemia- A1c 11.8- endocrine seeing for newly diagnosed DM- on basal/bolus  Hypokalemia - replete aggressively, replete magnesium too. Unclear why potassium is so persistently low.    Unclear etiology of his expressive aphasia since no stroke was seen on MRI but it is improving daily.

## 2025-01-17 NOTE — DISCHARGE NOTE NURSING/CASE MANAGEMENT/SOCIAL WORK - NSDCPEFALRISK_GEN_ALL_CORE
For information on Fall & Injury Prevention, visit: [email or web address] OR  [email or web address] OR  https://www.cdc.gov/steadi/patient.html [address], [place], [address]

## 2025-01-29 PROBLEM — Z00.00 ENCOUNTER FOR PREVENTIVE HEALTH EXAMINATION: Status: ACTIVE | Noted: 2025-01-29

## 2025-01-29 RX ORDER — NIFEDIPINE 60 MG/1
1 TABLET, EXTENDED RELEASE ORAL
Refills: 0 | DISCHARGE

## 2025-02-01 LAB
CULTURE RESULTS: SIGNIFICANT CHANGE UP
SPECIMEN SOURCE: SIGNIFICANT CHANGE UP

## 2025-03-05 ENCOUNTER — APPOINTMENT (OUTPATIENT)
Dept: PODIATRY | Facility: HOSPITAL | Age: 65
End: 2025-03-05

## 2025-03-26 ENCOUNTER — APPOINTMENT (OUTPATIENT)
Dept: ENDOCRINOLOGY | Facility: CLINIC | Age: 65
End: 2025-03-26

## 2025-03-28 ENCOUNTER — APPOINTMENT (OUTPATIENT)
Dept: ENDOCRINOLOGY | Facility: CLINIC | Age: 65
End: 2025-03-28

## 2025-05-02 ENCOUNTER — APPOINTMENT (OUTPATIENT)
Dept: NEUROLOGY | Facility: CLINIC | Age: 65
End: 2025-05-02

## 2025-05-23 ENCOUNTER — APPOINTMENT (OUTPATIENT)
Dept: OPHTHALMOLOGY | Facility: CLINIC | Age: 65
End: 2025-05-23

## 2025-06-25 ENCOUNTER — APPOINTMENT (OUTPATIENT)
Dept: ENDOCRINOLOGY | Facility: CLINIC | Age: 65
End: 2025-06-25